# Patient Record
Sex: FEMALE | Race: WHITE | NOT HISPANIC OR LATINO | Employment: OTHER | URBAN - NONMETROPOLITAN AREA
[De-identification: names, ages, dates, MRNs, and addresses within clinical notes are randomized per-mention and may not be internally consistent; named-entity substitution may affect disease eponyms.]

---

## 2019-03-10 ENCOUNTER — HOSPITAL ENCOUNTER (EMERGENCY)
Facility: OTHER | Age: 24
Discharge: HOME | End: 2019-03-11
Attending: EMERGENCY MEDICINE | Admitting: EMERGENCY MEDICINE
Payer: COMMERCIAL

## 2019-03-10 DIAGNOSIS — R10.13 ABDOMINAL PAIN, EPIGASTRIC: ICD-10-CM

## 2019-03-10 PROCEDURE — 83690 ASSAY OF LIPASE: CPT | Performed by: EMERGENCY MEDICINE

## 2019-03-10 PROCEDURE — 99283 EMERGENCY DEPT VISIT LOW MDM: CPT | Mod: Z6 | Performed by: EMERGENCY MEDICINE

## 2019-03-10 PROCEDURE — 36415 COLL VENOUS BLD VENIPUNCTURE: CPT | Performed by: EMERGENCY MEDICINE

## 2019-03-10 PROCEDURE — 96372 THER/PROPH/DIAG INJ SC/IM: CPT | Performed by: EMERGENCY MEDICINE

## 2019-03-10 PROCEDURE — 99284 EMERGENCY DEPT VISIT MOD MDM: CPT | Mod: 25 | Performed by: EMERGENCY MEDICINE

## 2019-03-10 PROCEDURE — 80053 COMPREHEN METABOLIC PANEL: CPT | Performed by: EMERGENCY MEDICINE

## 2019-03-10 PROCEDURE — 85025 COMPLETE CBC W/AUTO DIFF WBC: CPT | Performed by: EMERGENCY MEDICINE

## 2019-03-10 RX ORDER — ALUMINA, MAGNESIA, AND SIMETHICONE 2400; 2400; 240 MG/30ML; MG/30ML; MG/30ML
15 SUSPENSION ORAL ONCE
Status: COMPLETED | OUTPATIENT
Start: 2019-03-10 | End: 2019-03-11

## 2019-03-10 NOTE — ED AVS SNAPSHOT
[facility]  [address]  [place] [address]  Phone:  [phone]  Fax:  [phone]                                    Dania Kruger   MRN: [id number]    Department:  [facility]   Date of Visit:  3/10/2019           After Visit Summary Signature Page    I have received my discharge instructions, and my questions have been answered. I have discussed any challenges I see with this plan with the nurse or doctor.    ..........................................................................................................................................  Patient/Patient Representative Signature      ..........................................................................................................................................  Patient Representative Print Name and Relationship to Patient    ..................................................               ................................................  Date                                   Time    ..........................................................................................................................................  Reviewed by Signature/Title    ...................................................              ..............................................  Date                                               Time          22EPIC Rev 08/18

## 2019-03-11 ENCOUNTER — HOSPITAL ENCOUNTER (OUTPATIENT)
Dept: ULTRASOUND IMAGING | Facility: OTHER | Age: 24
Discharge: HOME | End: 2019-03-11
Attending: EMERGENCY MEDICINE | Admitting: EMERGENCY MEDICINE
Payer: COMMERCIAL

## 2019-03-11 VITALS
HEIGHT: 65 IN | TEMPERATURE: 98 F | RESPIRATION RATE: 16 BRPM | OXYGEN SATURATION: 99 % | DIASTOLIC BLOOD PRESSURE: 81 MMHG | SYSTOLIC BLOOD PRESSURE: 112 MMHG | HEART RATE: 55 BPM

## 2019-03-11 DIAGNOSIS — R10.13 ABDOMINAL PAIN, EPIGASTRIC: ICD-10-CM

## 2019-03-11 LAB
ALBUMIN SERPL-MCNC: 4.5 G/DL (ref 3.5–5.7)
ALP SERPL-CCNC: 54 U/L (ref 34–104)
ALT SERPL W P-5'-P-CCNC: 12 U/L (ref 7–52)
ANION GAP SERPL CALCULATED.3IONS-SCNC: 6 MMOL/L (ref 3–14)
AST SERPL W P-5'-P-CCNC: 11 U/L (ref 13–39)
BASOPHILS # BLD AUTO: 0 10E9/L (ref 0–0.2)
BASOPHILS NFR BLD AUTO: 0.2 %
BILIRUB SERPL-MCNC: 0.4 MG/DL (ref 0.3–1)
BUN SERPL-MCNC: 9 MG/DL (ref 7–25)
CALCIUM SERPL-MCNC: 10 MG/DL (ref 8.6–10.3)
CHLORIDE SERPL-SCNC: 101 MMOL/L (ref 98–107)
CO2 SERPL-SCNC: 26 MMOL/L (ref 21–31)
CREAT SERPL-MCNC: 0.62 MG/DL (ref 0.6–1.2)
DIFFERENTIAL METHOD BLD: NORMAL
EOSINOPHIL # BLD AUTO: 0 10E9/L (ref 0–0.7)
EOSINOPHIL NFR BLD AUTO: 0.4 %
ERYTHROCYTE [DISTWIDTH] IN BLOOD BY AUTOMATED COUNT: 12.2 % (ref 10–15)
GFR SERPL CREATININE-BSD FRML MDRD: >90 ML/MIN/{1.73_M2}
GLUCOSE SERPL-MCNC: 104 MG/DL (ref 70–105)
HCT VFR BLD AUTO: 37.6 % (ref 35–47)
HGB BLD-MCNC: 12.6 G/DL (ref 11.7–15.7)
IMM GRANULOCYTES # BLD: 0 10E9/L (ref 0–0.4)
IMM GRANULOCYTES NFR BLD: 0.2 %
LIPASE SERPL-CCNC: 5 U/L (ref 11–82)
LYMPHOCYTES # BLD AUTO: 2.6 10E9/L (ref 0.8–5.3)
LYMPHOCYTES NFR BLD AUTO: 27 %
MCH RBC QN AUTO: 29.1 PG (ref 26.5–33)
MCHC RBC AUTO-ENTMCNC: 33.5 G/DL (ref 31.5–36.5)
MCV RBC AUTO: 87 FL (ref 78–100)
MONOCYTES # BLD AUTO: 0.7 10E9/L (ref 0–1.3)
MONOCYTES NFR BLD AUTO: 7.7 %
NEUTROPHILS # BLD AUTO: 6.2 10E9/L (ref 1.6–8.3)
NEUTROPHILS NFR BLD AUTO: 64.5 %
PLATELET # BLD AUTO: 283 10E9/L (ref 150–450)
POTASSIUM SERPL-SCNC: 3.9 MMOL/L (ref 3.5–5.1)
PROT SERPL-MCNC: 7.5 G/DL (ref 6.4–8.9)
RBC # BLD AUTO: 4.33 10E12/L (ref 3.8–5.2)
SODIUM SERPL-SCNC: 133 MMOL/L (ref 134–144)
WBC # BLD AUTO: 9.6 10E9/L (ref 4–11)

## 2019-03-11 PROCEDURE — 76700 US EXAM ABDOM COMPLETE: CPT

## 2019-03-11 PROCEDURE — 25000125 ZZHC RX 250: Performed by: EMERGENCY MEDICINE

## 2019-03-11 PROCEDURE — 25000132 ZZH RX MED GY IP 250 OP 250 PS 637: Performed by: EMERGENCY MEDICINE

## 2019-03-11 PROCEDURE — 25000128 H RX IP 250 OP 636: Performed by: EMERGENCY MEDICINE

## 2019-03-11 RX ORDER — KETOROLAC TROMETHAMINE 30 MG/ML
60 INJECTION, SOLUTION INTRAMUSCULAR; INTRAVENOUS ONCE
Status: COMPLETED | OUTPATIENT
Start: 2019-03-11 | End: 2019-03-11

## 2019-03-11 RX ADMIN — LIDOCAINE HYDROCHLORIDE 15 ML: 20 SOLUTION ORAL; TOPICAL at 00:07

## 2019-03-11 RX ADMIN — KETOROLAC TROMETHAMINE 60 MG: 30 INJECTION, SOLUTION INTRAMUSCULAR at 00:34

## 2019-03-11 RX ADMIN — ALUMINUM HYDROXIDE, MAGNESIUM HYDROXIDE, AND DIMETHICONE 15 ML: 400; 400; 40 SUSPENSION ORAL at 00:06

## 2019-03-11 ASSESSMENT — ENCOUNTER SYMPTOMS
NAUSEA: 1
AGITATION: 0
ARTHRALGIAS: 0
EYE REDNESS: 0
VOMITING: 0
FEVER: 0
DYSURIA: 0
LIGHT-HEADEDNESS: 0
SHORTNESS OF BREATH: 0
CHILLS: 0
COUGH: 0

## 2019-03-11 NOTE — ED TRIAGE NOTES
"Pt comes into the ER c/o upper abdominal/back pain that started around 1300. Constant pain. Worse after eating. No vomiting. Nausea. No diarrhea, normal bowel movements. No problems with urination. Was seen in [place] last night for similar symptoms, \"ct scan normal. White count a little elevated. They didn't say what was wrong.\"  Following up due to same symptoms.   "

## 2019-03-11 NOTE — ED PROVIDER NOTES
History     Chief Complaint   Patient presents with     Abdominal Pain     HPI  Dania Kruger is a 23 year old female who is here with abdominal pain.  She says the pain began at 1:00 yesterday afternoon, about 34 hours ago.  It was a couple hours after she had eaten some eggs.  The pain is in the upper abdomen.  When it began she was quite nauseous but that is better now.  She calls a stabbing pain.  Has radiated to the back.  It is across her entire upper abdomen and not worse one side more than the other.  She says she just cannot get comfortable.  There is nothing that makes it better or makes it worse.  Although she is nauseous, she has not had any emesis.  Normal bowel movements.  Urinating normally.  Not feeling dizzy lightheaded and her urine is not becoming dark colored.  She is drinking plenty of liquids but she has eaten some food today as well.  Going over bumps on the right here did seem to make it worse.    She was seen in the emergency department in [place] yesterday.  They did a CT scan which was apparently normal.  White blood count was mildly elevated.  Her pain right now is a 7 or 8 out of 10.  At its worst it was a 9 out of 10.  She feels like it is just not getting any better, it is really not getting a lot worse throughout the day either.    Allergies:  No Known Allergies    Problem List:    There are no active problems to display for this patient.       Past Medical History:    No past medical history on file.    Past Surgical History:    No past surgical history on file.    Family History:    No family history on file.    Social History:  Marital Status:    Social History     Tobacco Use     Smoking status: Not on file   Substance Use Topics     Alcohol use: Not on file     Drug use: Not on file        Medications:      No current outpatient medications on file.      Review of Systems   Constitutional: Negative for chills and fever.   HENT: Negative for congestion.    Eyes: Negative for  "redness.   Respiratory: Negative for cough and shortness of breath.    Cardiovascular: Negative for chest pain.   Gastrointestinal: Positive for nausea. Negative for vomiting.   Genitourinary: Negative for dysuria.   Musculoskeletal: Negative for arthralgias.   Skin: Negative for rash.   Neurological: Negative for light-headedness.   Psychiatric/Behavioral: Negative for agitation.       Physical Exam   BP: 129/84  Pulse: 57  Heart Rate: 64  Temp: 98.6  F (37  C)  Resp: 16  Height: 165.1 cm (5' 5\")  SpO2: 97 %      Physical Exam   Constitutional: She is oriented to person, place, and time. She appears well-developed and well-nourished. No distress.   HENT:   Head: Normocephalic and atraumatic.   Eyes: Conjunctivae are normal.   Neck: Neck supple.   Cardiovascular: Normal rate, regular rhythm and normal heart sounds.   Pulmonary/Chest: Effort normal and breath sounds normal.   Abdominal: Soft. Bowel sounds are normal. There is tenderness in the right upper quadrant, epigastric area and left upper quadrant. There is no rigidity, no guarding and no CVA tenderness.   Neurological: She is alert and oriented to person, place, and time.   Skin: Skin is warm and dry. She is not diaphoretic.   Psychiatric: She has a normal mood and affect. Her behavior is normal.   Nursing note and vitals reviewed.      ED Course     ED Course as of Mar 11 0108   Sun Mar 10, 2019   2862 We will recheck labs tonight including conference of metabolic panel lipase and CBC.  While waiting we will start with a GI cocktail to see if this helps at all.  If this does not help we will try something else for her pain.      Procedures  Results for orders placed or performed during the hospital encounter of 03/10/19   CBC with platelets differential   Result Value Ref Range    WBC 9.6 4.0 - 11.0 10e9/L    RBC Count 4.33 3.8 - 5.2 10e12/L    Hemoglobin 12.6 11.7 - 15.7 g/dL    Hematocrit 37.6 35.0 - 47.0 %    MCV 87 78 - 100 fl    MCH 29.1 26.5 - 33.0 pg "    MCHC 33.5 31.5 - 36.5 g/dL    RDW 12.2 10.0 - 15.0 %    Platelet Count 283 150 - 450 10e9/L    Diff Method Automated Method     % Neutrophils 64.5 %    % Lymphocytes 27.0 %    % Monocytes 7.7 %    % Eosinophils 0.4 %    % Basophils 0.2 %    % Immature Granulocytes 0.2 %    Absolute Neutrophil 6.2 1.6 - 8.3 10e9/L    Absolute Lymphocytes 2.6 0.8 - 5.3 10e9/L    Absolute Monocytes 0.7 0.0 - 1.3 10e9/L    Absolute Eosinophils 0.0 0.0 - 0.7 10e9/L    Absolute Basophils 0.0 0.0 - 0.2 10e9/L    Abs Immature Granulocytes 0.0 0 - 0.4 10e9/L   Comprehensive metabolic panel   Result Value Ref Range    Sodium 133 (L) 134 - 144 mmol/L    Potassium 3.9 3.5 - 5.1 mmol/L    Chloride 101 98 - 107 mmol/L    Carbon Dioxide 26 21 - 31 mmol/L    Anion Gap 6 3 - 14 mmol/L    Glucose 104 70 - 105 mg/dL    Urea Nitrogen 9 7 - 25 mg/dL    Creatinine 0.62 0.60 - 1.20 mg/dL    GFR Estimate >90 >60 mL/min/[1.73_m2]    GFR Estimate If Black >90 >60 mL/min/[1.73_m2]    Calcium 10.0 8.6 - 10.3 mg/dL    Bilirubin Total 0.4 0.3 - 1.0 mg/dL    Albumin 4.5 3.5 - 5.7 g/dL    Protein Total 7.5 6.4 - 8.9 g/dL    Alkaline Phosphatase 54 34 - 104 U/L    ALT 12 7 - 52 U/L    AST 11 (L) 13 - 39 U/L   Lipase   Result Value Ref Range    Lipase 5 (L) 11 - 82 U/L             Was able to review labs and CT scan from the emergency department visit in [place] last night.  This did show elevated white blood count 11.5, however liver related tests lipase and lactic acid were all normal.  CT scan also was normal with no acute findings.  Today I repeated the lab work and the white blood count had returned to normal.  Lipase and liver related tests still normal.    No clear cause for her pain at this time, it is upper abdominal and it did start after having eaten yesterday so gallbladder is certainly a possibility.  Her pain is controlled after a shot of Toradol and I do not believe she has infection and this is not emergent.  I will not call ultrasound in in the  middle the night, however I will order an outpatient ultrasound to better evaluate gallbladder area.  She normally sees Dr. Harmon in [place], and she can call him as well and follow-up there if she prefers for any testing.  In the meantime we will send her home with a few hydrocodone.  She should return if worse.         No results found for this or any previous visit (from the past 24 hour(s)).    Medications   alum & mag hydroxide-simethicone (MYLANTA ES/MAALOX  ES) suspension 15 mL (15 mLs Oral Given 3/11/19 0006)     And   lidocaine VISCOUS (XYLOCAINE) 2 % solution 15 mL (15 mLs Mouth/Throat Given 3/11/19 0007)   ketorolac (TORADOL) injection 60 mg (60 mg Intramuscular Given 3/11/19 0034)       Assessments & Plan (with Medical Decision Making)     I have reviewed the nursing notes.    I have reviewed the findings, diagnosis, plan and need for follow up with the patient.         Medication List      There are no discharge medications for this visit.         Final diagnoses:   Abdominal pain, epigastric       3/10/2019   [facility] [place]     Isac Bowman MD  03/11/19 0108

## 2019-03-22 ENCOUNTER — HEALTH MAINTENANCE LETTER (OUTPATIENT)
Age: 24
End: 2019-03-22

## 2020-03-11 ENCOUNTER — HEALTH MAINTENANCE LETTER (OUTPATIENT)
Age: 25
End: 2020-03-11

## 2020-11-04 ENCOUNTER — MEDICAL CORRESPONDENCE (OUTPATIENT)
Dept: HEALTH INFORMATION MANAGEMENT | Facility: OTHER | Age: 25
End: 2020-11-04

## 2020-11-04 ENCOUNTER — TRANSFERRED RECORDS (OUTPATIENT)
Dept: HEALTH INFORMATION MANAGEMENT | Facility: OTHER | Age: 25
End: 2020-11-04

## 2020-11-20 ENCOUNTER — VIRTUAL VISIT (OUTPATIENT)
Dept: OBGYN | Facility: OTHER | Age: 25
End: 2020-11-20
Attending: OBSTETRICS & GYNECOLOGY
Payer: COMMERCIAL

## 2020-11-20 VITALS — HEIGHT: 64 IN | WEIGHT: 150 LBS | BODY MASS INDEX: 25.61 KG/M2

## 2020-11-20 DIAGNOSIS — Z32.01 PREGNANCY EXAMINATION OR TEST, POSITIVE RESULT: Primary | ICD-10-CM

## 2020-11-20 PROCEDURE — 99207 PR OB VISIT-NO CHARGE - GICH ONLY: CPT | Mod: 95

## 2020-11-20 ASSESSMENT — PATIENT HEALTH QUESTIONNAIRE - PHQ9: SUM OF ALL RESPONSES TO PHQ QUESTIONS 1-9: 0

## 2020-11-20 ASSESSMENT — MIFFLIN-ST. JEOR: SCORE: 1420.4

## 2020-11-20 NOTE — PROGRESS NOTES
HPI:    This is a 25 year old female patient,  who was called today for OB Intake visit. Patient reports positive pregnancy test at home.     Obstetrical history and OB Demographics updated to the best of this nurse's ability based on patient report. PHQ-9 depression screening and routine Domestic Abuse screening completed. All immediate questions and concerns answered.    Last menstrual period is reported as Patient's last menstrual period was 10/02/2020 (exact date). SANDIE based on LMP is 2021.  Her cycles are regular.  Her last menstrual period was normal.   Since her LMP, she has experienced  nausea, abdominal pain, fatigue, headache and loss of appetite.       Personal OB history includes: none  Previous OB Provider: NA  Previous Delivering Clinic: NA  Release of Records: NA    Current delivery plan: GICH  Preferred OB Provider: Dr. Joseph William MD  Current Primary Care Provider: General  Pediatrician: unknown    Additional History: None    Have you travelled during the pregnancy?No  Have your sexual partner(s) travelled during the pregnancy?No      HISTORY:   Planned Pregnancy: Yes  Marital Status: Single  Occupation: Teacher in [email or web address]  Living in Household: Spouse    Father of the baby is involved.   Family and father of baby are supportive of current pregnancy.  Past Medical History of Father of Baby:Insulin Dependent    Past History:  Her past medical history is non-contributory.      She has a history of  none    Since her last LMP she denies use of alcohol, tobacco and street drugs.    Pap smear history: NO - age 21-29 PAP every 3 years recommended    STD/STI history: No STD history    STD/STI symptoms: no noticeable symptoms     Past medical, surgical, social and family history were reviewed and updated in EPIC.    Medications reviewed by this nurse. Current medication list:  Current Outpatient Medications   Medication Sig Dispense Refill     Prenatal Vit-Fe Fumarate-FA (PRENATAL  MULTIVITAMIN  PLUS IRON) 27-1 MG TABS Take 1 tablet by mouth daily       The following medications were recommended to be discontinued due to Pregnancy Category D status: NA  Patient informed to contact her primary care provider as soon as possible to discuss a safer alternative.    Risk factors:  Moderate and moderately severe risks (consult with OB/Gyn)  Previous fetal or  demise: No  History of  delivery: No  History of heart disease Class I: No  Severe anemia, unresponsive to iron therapy: No  Pelvic mass or neoplasm: No  Previous : No  Hyper/hypothyroidism: No  History of postpartum hemorrhage requiring transfusion:No  History of Placenta Accreta: No    High Risk (Pregnancy managed by OB/Gyn)  Multiple pregnancy: No  Pre-gestational diabetes: No  Chronic Hypertension: No  Renal Failure: No  Heart disease, class II or greater: No  Rh Isoimmunization: No  Chronic active hepatitis: No  Convulsive disorder, poorly controlled: No  Isoimmune thrombocytopenia: No  Pre-term premature rupture of membranes: No  Lupus or other autoimmune disorder: No  Human Immunodeficiency Virus: No      ASSESSMENT/PLAN:     No diagnosis found.    25 year old , 7w0d of pregnancy with SANDIE of 2021, Alternate SANDIE Entry    Per standing orders and scope of practice of this nurse, patient will have the following orders placed and completed prior to initial OB visit with the appropriate provider:    --early ultrasound for dating and viability ordered for 6+ weeks gestation based on LMP    --Quantitative Beta HCG and progesterone monitoring if indicated    Counseling given:     - Recommended weight gain for pregnancy: 15-25 lbs.   BMI < 18.5  28-40 lbs   18.5 - 24.9 25-35   25 - 29.9 15-25   > 30  < 15       PLAN/PATIENT INSTRUCTIONS:    Normal exercise.  Normal sexual activity.  Prenatal vitamins.  Anticipated weight gain.    follow-up appointment with Dr. Dr. Joseph William MD for pre- care and take  multivitamin or pre- vitamins    Yanet William RN.................................................. 2020 1:41 PM

## 2020-12-01 ENCOUNTER — TRANSFERRED RECORDS (OUTPATIENT)
Dept: HEALTH INFORMATION MANAGEMENT | Facility: OTHER | Age: 25
End: 2020-12-01

## 2020-12-08 ENCOUNTER — PRENATAL OFFICE VISIT (OUTPATIENT)
Dept: OBGYN | Facility: OTHER | Age: 25
End: 2020-12-08
Attending: OBSTETRICS & GYNECOLOGY
Payer: COMMERCIAL

## 2020-12-08 ENCOUNTER — TRANSFERRED RECORDS (OUTPATIENT)
Dept: HEALTH INFORMATION MANAGEMENT | Facility: OTHER | Age: 25
End: 2020-12-08

## 2020-12-08 DIAGNOSIS — Z3A.09 9 WEEKS GESTATION OF PREGNANCY: Primary | ICD-10-CM

## 2020-12-08 LAB — HIV 1&2 EXT: NORMAL

## 2020-12-08 PROCEDURE — 99207 PR OB VISIT-NO CHARGE - GICH ONLY: CPT | Performed by: OBSTETRICS & GYNECOLOGY

## 2020-12-10 NOTE — PROGRESS NOTES
CC: New OB visit  HPI:  Dania Kruger is  at 9w6d based on Patient's last menstrual period was 10/02/2020 (exact date)./first trimester US confirms.  She notes issues of fatigue    OB History    Para Term  AB Living   1 0 0 0 0 0   SAB TAB Ectopic Multiple Live Births   0 0 0 0 0      # Outcome Date GA Lbr Nathan/2nd Weight Sex Delivery Anes PTL Lv   1 Current              STI: (denies HSV, Hep C, Hep B, HIV, Syphilis, Chlamydia, Gonorrhea)  Last pap smear: No results found for: PAP    No past medical history on file.   has no past surgical history on file.    Social History     Tobacco Use     Smoking status: Never Smoker     Smokeless tobacco: Never Used   Substance Use Topics     Alcohol use: Not Currently     Drug use: Never     No family history on file.      Current Outpatient Medications   Medication     Prenatal Vit-Fe Fumarate-FA (PRENATAL MULTIVITAMIN  PLUS IRON) 27-1 MG TABS     No current facility-administered medications for this visit.      No Known Allergies    There is no immunization history on file for this patient.        REVIEW OF SYSTEMS  ENT: negative  Resp: No shortness of breath, dyspnea on exertion, cough, or hemoptysis  CV: negative  GI: negative  : negative    EXAM: LMP 10/02/2020 (Exact Date)   Gen: NAD  CV: RRR with normal S1, S2, no GRM  Resp: CTA Bilaterally  Abdomen: NT, ND  Extremities: No TTP, no deformity  Neuro: CN II-XII intact grossly, moves all extremities  Psych: normal affect and mentation.    I/P  No diagnosis found.      Discussed safety, nutrition, screening for cystic fibrosis, spina bifida, spinal muscular atrophy, quad screen, cffDNA screening as appropriate.  F/U scheduled, discussed call schedule rotation with KURTIS and Dr. William, general surgery.  Return visit in 1 month     Pregnancy risk factors include:  None  Joseph William MD   2:49 PM 12/10/2020

## 2021-01-03 ENCOUNTER — HEALTH MAINTENANCE LETTER (OUTPATIENT)
Age: 26
End: 2021-01-03

## 2021-01-12 ENCOUNTER — PRENATAL OFFICE VISIT (OUTPATIENT)
Dept: OBGYN | Facility: OTHER | Age: 26
End: 2021-01-12
Attending: OBSTETRICS & GYNECOLOGY
Payer: COMMERCIAL

## 2021-01-12 DIAGNOSIS — Z3A.20 20 WEEKS GESTATION OF PREGNANCY: ICD-10-CM

## 2021-01-12 DIAGNOSIS — Z3A.14 14 WEEKS GESTATION OF PREGNANCY: Primary | ICD-10-CM

## 2021-01-12 PROCEDURE — 99207 PR OB VISIT-NO CHARGE - GICH ONLY: CPT | Performed by: OBSTETRICS & GYNECOLOGY

## 2021-01-14 VITALS
SYSTOLIC BLOOD PRESSURE: 123 MMHG | BODY MASS INDEX: 26.32 KG/M2 | WEIGHT: 153.31 LBS | DIASTOLIC BLOOD PRESSURE: 80 MMHG

## 2021-01-14 NOTE — PROGRESS NOTES
See in [place]  Continues to do well  Quad discussed  Planning 4 weeks in [facility]  20 week US in [facility]

## 2021-02-09 ENCOUNTER — PRENATAL OFFICE VISIT (OUTPATIENT)
Dept: OBGYN | Facility: OTHER | Age: 26
End: 2021-02-09
Attending: OBSTETRICS & GYNECOLOGY
Payer: COMMERCIAL

## 2021-02-09 DIAGNOSIS — Z3A.18 18 WEEKS GESTATION OF PREGNANCY: Primary | ICD-10-CM

## 2021-02-09 PROCEDURE — 99207 PR OB VISIT-NO CHARGE - GICH ONLY: CPT | Performed by: OBSTETRICS & GYNECOLOGY

## 2021-02-11 NOTE — PROGRESS NOTES
Seen in [place]  Overall doing well  Unsure if feeling movement  Declines quad  US next week in [place]  One month in [facility]

## 2021-02-18 ENCOUNTER — PRENATAL OFFICE VISIT (OUTPATIENT)
Dept: OBGYN | Facility: OTHER | Age: 26
End: 2021-02-18
Attending: OBSTETRICS & GYNECOLOGY
Payer: COMMERCIAL

## 2021-02-18 ENCOUNTER — HOSPITAL ENCOUNTER (OUTPATIENT)
Dept: ULTRASOUND IMAGING | Facility: OTHER | Age: 26
End: 2021-02-18
Attending: OBSTETRICS & GYNECOLOGY
Payer: COMMERCIAL

## 2021-02-18 VITALS
WEIGHT: 157.9 LBS | BODY MASS INDEX: 27.1 KG/M2 | HEART RATE: 103 BPM | SYSTOLIC BLOOD PRESSURE: 118 MMHG | DIASTOLIC BLOOD PRESSURE: 70 MMHG

## 2021-02-18 DIAGNOSIS — Z3A.24 24 WEEKS GESTATION OF PREGNANCY: ICD-10-CM

## 2021-02-18 DIAGNOSIS — Z3A.19 19 WEEKS GESTATION OF PREGNANCY: Primary | ICD-10-CM

## 2021-02-18 DIAGNOSIS — Z3A.20 20 WEEKS GESTATION OF PREGNANCY: ICD-10-CM

## 2021-02-18 PROCEDURE — 99207 PR OB VISIT-NO CHARGE - GICH ONLY: CPT | Performed by: OBSTETRICS & GYNECOLOGY

## 2021-02-18 PROCEDURE — 76805 OB US >/= 14 WKS SNGL FETUS: CPT

## 2021-02-18 ASSESSMENT — PAIN SCALES - GENERAL: PAINLEVEL: NO PAIN (0)

## 2021-02-18 NOTE — NURSING NOTE
Chief Complaint   Patient presents with     Prenatal Care     19w 6d      US today. Some questions or concerns.     Medication Reconciliation: complete    Lucy Foley LPN

## 2021-02-18 NOTE — PROGRESS NOTES
US today  Growth at 11% (5 day lag) - symmetrical  No abn seen by exam of heart and spine less than optimal due to position  Posterior placenta without previa  F/U in BF 3 weeks, 5 weeks here for repeat US

## 2021-02-24 ENCOUNTER — TELEPHONE (OUTPATIENT)
Dept: OBGYN | Facility: OTHER | Age: 26
End: 2021-02-24

## 2021-02-24 NOTE — TELEPHONE ENCOUNTER
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 States she tried to get fresh air as often as she could and did get a headache at one point. States she feels fine now with no complaints at this time. Advised patient to stay in well ventilated areas when around strong fumes and to avoid them when possible. Patient also stated that she is having an increase in her cloudy urine. Advised her to call her clinic in [facility] to get her urine checked out as Dr. William is out of office this week. Patient voiced understanding with no further questions or concerns at this time.   Yanet William RN on 2/24/2021 at 2:43 PM

## 2021-03-09 ENCOUNTER — PRENATAL OFFICE VISIT (OUTPATIENT)
Dept: OBGYN | Facility: OTHER | Age: 26
End: 2021-03-09
Attending: OBSTETRICS & GYNECOLOGY
Payer: COMMERCIAL

## 2021-03-09 VITALS — DIASTOLIC BLOOD PRESSURE: 72 MMHG | BODY MASS INDEX: 28.49 KG/M2 | SYSTOLIC BLOOD PRESSURE: 116 MMHG | WEIGHT: 166 LBS

## 2021-03-09 DIAGNOSIS — Z3A.22 22 WEEKS GESTATION OF PREGNANCY: Primary | ICD-10-CM

## 2021-03-09 PROCEDURE — 99207 PR OB VISIT-NO CHARGE - GICH ONLY: CPT | Performed by: OBSTETRICS & GYNECOLOGY

## 2021-03-25 ENCOUNTER — HOSPITAL ENCOUNTER (OUTPATIENT)
Dept: ULTRASOUND IMAGING | Facility: OTHER | Age: 26
End: 2021-03-25
Attending: OBSTETRICS & GYNECOLOGY
Payer: COMMERCIAL

## 2021-03-25 ENCOUNTER — PRENATAL OFFICE VISIT (OUTPATIENT)
Dept: OBGYN | Facility: OTHER | Age: 26
End: 2021-03-25
Attending: OBSTETRICS & GYNECOLOGY
Payer: COMMERCIAL

## 2021-03-25 VITALS
RESPIRATION RATE: 8 BRPM | HEART RATE: 88 BPM | WEIGHT: 164.9 LBS | SYSTOLIC BLOOD PRESSURE: 126 MMHG | BODY MASS INDEX: 28.31 KG/M2 | DIASTOLIC BLOOD PRESSURE: 72 MMHG

## 2021-03-25 DIAGNOSIS — Z3A.24 24 WEEKS GESTATION OF PREGNANCY: ICD-10-CM

## 2021-03-25 DIAGNOSIS — Z3A.24 24 WEEKS GESTATION OF PREGNANCY: Primary | ICD-10-CM

## 2021-03-25 PROCEDURE — 76816 OB US FOLLOW-UP PER FETUS: CPT

## 2021-03-25 PROCEDURE — 99207 PR OB VISIT-NO CHARGE - GICH ONLY: CPT | Performed by: OBSTETRICS & GYNECOLOGY

## 2021-03-25 ASSESSMENT — PAIN SCALES - GENERAL: PAINLEVEL: NO PAIN (0)

## 2021-03-25 NOTE — NURSING NOTE
"Chief Complaint   Patient presents with     Prenatal Care     24w 6d       Initial /72 (BP Location: Right arm, Patient Position: Sitting, Cuff Size: Adult Regular)   Pulse 88   Resp 8   Wt 74.8 kg (164 lb 14.4 oz)   LMP 10/02/2020 (Exact Date)   Breastfeeding No   BMI 28.31 kg/m   Estimated body mass index is 28.31 kg/m  as calculated from the following:    Height as of 11/20/20: 1.626 m (5' 4\").    Weight as of this encounter: 74.8 kg (164 lb 14.4 oz).  Medication Reconciliation: Completed     Neftali Torres LPN  "

## 2021-03-25 NOTE — PROGRESS NOTES
F/U US   Growth at 25%, symmetrical  Heart and spine look good  CRISTINA 14  Placenta posterior  Next appt in [facility] with BS and labs

## 2021-04-13 ENCOUNTER — TRANSFERRED RECORDS (OUTPATIENT)
Dept: HEALTH INFORMATION MANAGEMENT | Facility: OTHER | Age: 26
End: 2021-04-13

## 2021-04-25 ENCOUNTER — HEALTH MAINTENANCE LETTER (OUTPATIENT)
Age: 26
End: 2021-04-25

## 2021-04-30 ENCOUNTER — PRENATAL OFFICE VISIT (OUTPATIENT)
Dept: OBGYN | Facility: OTHER | Age: 26
End: 2021-04-30
Attending: OBSTETRICS & GYNECOLOGY
Payer: COMMERCIAL

## 2021-04-30 VITALS
BODY MASS INDEX: 29.7 KG/M2 | DIASTOLIC BLOOD PRESSURE: 68 MMHG | WEIGHT: 173 LBS | SYSTOLIC BLOOD PRESSURE: 116 MMHG | HEART RATE: 80 BPM

## 2021-04-30 DIAGNOSIS — Z3A.30 30 WEEKS GESTATION OF PREGNANCY: Primary | ICD-10-CM

## 2021-04-30 PROCEDURE — 99207 PR OB VISIT-NO CHARGE - GICH ONLY: CPT | Performed by: OBSTETRICS & GYNECOLOGY

## 2021-04-30 ASSESSMENT — PAIN SCALES - GENERAL: PAINLEVEL: NO PAIN (0)

## 2021-04-30 NOTE — NURSING NOTE
Chief Complaint   Patient presents with     Prenatal Care     30w0d     Offers no complaints and baby is moving great   Lea Kruger LPN........................4/30/2021  10:50 AM     Medication Reconciliation: completed   Lea Kruger LPN  4/30/2021 10:50 AM

## 2021-05-11 ENCOUNTER — PRENATAL OFFICE VISIT (OUTPATIENT)
Dept: OBGYN | Facility: OTHER | Age: 26
End: 2021-05-11
Attending: OBSTETRICS & GYNECOLOGY
Payer: COMMERCIAL

## 2021-05-11 DIAGNOSIS — Z3A.31 31 WEEKS GESTATION OF PREGNANCY: Primary | ICD-10-CM

## 2021-05-11 PROCEDURE — 99207 PR OB VISIT-NO CHARGE - GICH ONLY: CPT | Performed by: OBSTETRICS & GYNECOLOGY

## 2021-05-13 NOTE — PROGRESS NOTES
Seen in [place]  Doing well  No concerns  Good fetal activity  Is probably going to decline the tdap  2 week f/u in [place]

## 2021-05-27 ENCOUNTER — PRENATAL OFFICE VISIT (OUTPATIENT)
Dept: OBGYN | Facility: OTHER | Age: 26
End: 2021-05-27
Attending: OBSTETRICS & GYNECOLOGY
Payer: COMMERCIAL

## 2021-05-27 VITALS
SYSTOLIC BLOOD PRESSURE: 120 MMHG | HEART RATE: 104 BPM | DIASTOLIC BLOOD PRESSURE: 72 MMHG | BODY MASS INDEX: 30.36 KG/M2 | RESPIRATION RATE: 16 BRPM | WEIGHT: 176.9 LBS

## 2021-05-27 PROCEDURE — 99207 PR OB VISIT-NO CHARGE - GICH ONLY: CPT | Performed by: OBSTETRICS & GYNECOLOGY

## 2021-05-27 ASSESSMENT — PAIN SCALES - GENERAL: PAINLEVEL: NO PAIN (0)

## 2021-05-27 NOTE — NURSING NOTE
"Chief Complaint   Patient presents with     Prenatal Care     33w 6d     Patient stated no concerns. Doing well. Normal fetal movement.    Initial /72 (BP Location: Right arm, Patient Position: Sitting, Cuff Size: Adult Regular)   Pulse 104   Resp 16   Wt 80.2 kg (176 lb 14.4 oz)   LMP 10/02/2020 (Exact Date)   Breastfeeding No   BMI 30.36 kg/m   Estimated body mass index is 30.36 kg/m  as calculated from the following:    Height as of 11/20/20: 1.626 m (5' 4\").    Weight as of this encounter: 80.2 kg (176 lb 14.4 oz).  Medication Reconciliation: Completed     Neftali Torres LPN  "

## 2021-06-08 ENCOUNTER — PRENATAL OFFICE VISIT (OUTPATIENT)
Dept: OBGYN | Facility: OTHER | Age: 26
End: 2021-06-08
Attending: OBSTETRICS & GYNECOLOGY
Payer: COMMERCIAL

## 2021-06-08 DIAGNOSIS — Z3A.35 35 WEEKS GESTATION OF PREGNANCY: Primary | ICD-10-CM

## 2021-06-08 PROCEDURE — 99207 PR OB VISIT-NO CHARGE - GICH ONLY: CPT | Performed by: OBSTETRICS & GYNECOLOGY

## 2021-06-10 VITALS
SYSTOLIC BLOOD PRESSURE: 124 MMHG | DIASTOLIC BLOOD PRESSURE: 73 MMHG | BODY MASS INDEX: 30.65 KG/M2 | WEIGHT: 178.56 LBS

## 2021-06-14 ENCOUNTER — PRENATAL OFFICE VISIT (OUTPATIENT)
Dept: OBGYN | Facility: OTHER | Age: 26
End: 2021-06-14
Attending: OBSTETRICS & GYNECOLOGY
Payer: COMMERCIAL

## 2021-06-14 VITALS — SYSTOLIC BLOOD PRESSURE: 116 MMHG | DIASTOLIC BLOOD PRESSURE: 72 MMHG

## 2021-06-14 DIAGNOSIS — Z3A.36 36 WEEKS GESTATION OF PREGNANCY: Primary | ICD-10-CM

## 2021-06-14 PROCEDURE — 99207 PR OB VISIT-NO CHARGE - GICH ONLY: CPT | Performed by: OBSTETRICS & GYNECOLOGY

## 2021-06-14 PROCEDURE — 87081 CULTURE SCREEN ONLY: CPT | Mod: ZL | Performed by: OBSTETRICS & GYNECOLOGY

## 2021-06-14 NOTE — PROGRESS NOTES
Doing well  Infant active  Occasional ctx  (Told me after the check that she might have had a bit of watery discharge but none recently)  Portable US confirms vertex.  CRISTINA 11 (Max pocket of 5)  GBS done  To return if further watery discharge

## 2021-06-16 LAB
BACTERIA SPEC CULT: NORMAL
SPECIMEN SOURCE: NORMAL

## 2021-06-21 ENCOUNTER — PRENATAL OFFICE VISIT (OUTPATIENT)
Dept: OBGYN | Facility: OTHER | Age: 26
End: 2021-06-21
Attending: OBSTETRICS & GYNECOLOGY
Payer: COMMERCIAL

## 2021-06-21 VITALS
HEART RATE: 92 BPM | RESPIRATION RATE: 12 BRPM | BODY MASS INDEX: 31.48 KG/M2 | WEIGHT: 183.4 LBS | DIASTOLIC BLOOD PRESSURE: 64 MMHG | SYSTOLIC BLOOD PRESSURE: 130 MMHG

## 2021-06-21 DIAGNOSIS — Z3A.37 37 WEEKS GESTATION OF PREGNANCY: Primary | ICD-10-CM

## 2021-06-21 LAB — A1 MICROGLOB PLACENTAL VAG QL: NEGATIVE

## 2021-06-21 PROCEDURE — 84112 EVAL AMNIOTIC FLUID PROTEIN: CPT | Mod: ZL | Performed by: OBSTETRICS & GYNECOLOGY

## 2021-06-21 PROCEDURE — 99207 PR OB VISIT-NO CHARGE - GICH ONLY: CPT | Performed by: OBSTETRICS & GYNECOLOGY

## 2021-06-21 ASSESSMENT — PAIN SCALES - GENERAL: PAINLEVEL: NO PAIN (0)

## 2021-06-21 NOTE — PROGRESS NOTES
Infant active  GBS neg  Still with occasional discharge - will check amni-sure - negative  Labor again reviewed  One week

## 2021-06-21 NOTE — NURSING NOTE
"Chief Complaint   Patient presents with     Prenatal Care     37w 3d     Patient stated no concerns. Baby is active.    Initial /64 (BP Location: Right arm, Patient Position: Sitting, Cuff Size: Adult Regular)   Pulse 92   Resp 12   Wt 83.2 kg (183 lb 6.4 oz)   LMP 10/02/2020 (Exact Date)   Breastfeeding No   BMI 31.48 kg/m   Estimated body mass index is 31.48 kg/m  as calculated from the following:    Height as of 11/20/20: 1.626 m (5' 4\").    Weight as of this encounter: 83.2 kg (183 lb 6.4 oz).  Medication Reconciliation: Completed     Neftali Torres LPN  "

## 2021-07-01 ENCOUNTER — PRENATAL OFFICE VISIT (OUTPATIENT)
Dept: OBGYN | Facility: OTHER | Age: 26
End: 2021-07-01
Attending: OBSTETRICS & GYNECOLOGY
Payer: COMMERCIAL

## 2021-07-01 VITALS
BODY MASS INDEX: 31.53 KG/M2 | RESPIRATION RATE: 16 BRPM | HEART RATE: 88 BPM | SYSTOLIC BLOOD PRESSURE: 136 MMHG | WEIGHT: 183.7 LBS | OXYGEN SATURATION: 98 % | DIASTOLIC BLOOD PRESSURE: 70 MMHG

## 2021-07-01 DIAGNOSIS — Z3A.38 38 WEEKS GESTATION OF PREGNANCY: Primary | ICD-10-CM

## 2021-07-01 PROCEDURE — 99207 PR OB VISIT-NO CHARGE - GICH ONLY: CPT | Performed by: OBSTETRICS & GYNECOLOGY

## 2021-07-01 ASSESSMENT — PAIN SCALES - GENERAL: PAINLEVEL: NO PAIN (0)

## 2021-07-01 NOTE — NURSING NOTE
"Chief Complaint   Patient presents with     Prenatal Care     38w 6d         Initial /70   Pulse 88   Resp 16   Wt 83.3 kg (183 lb 11.2 oz)   LMP 10/02/2020 (Exact Date)   SpO2 98%   BMI 31.53 kg/m   Estimated body mass index is 31.53 kg/m  as calculated from the following:    Height as of 11/20/20: 1.626 m (5' 4\").    Weight as of this encounter: 83.3 kg (183 lb 11.2 oz).     FOOD SECURITY SCREENING QUESTIONS  Hunger Vital Signs:  Within the past 12 months we worried whether our food would run out before we got money to buy more. Never  Within the past 12 months the food we bought just didn't last and we didn't have money to get more. Never      Medication Reconciliation: Complete      Norma J. Gosselin, LPN   "

## 2021-07-01 NOTE — PROGRESS NOTES
Doing well  Infant very active  No significant contractions  Discussed induction at 41 weeks of still pregnant - pt agrees

## 2021-07-06 ENCOUNTER — PRENATAL OFFICE VISIT (OUTPATIENT)
Dept: OBGYN | Facility: OTHER | Age: 26
End: 2021-07-06
Attending: OBSTETRICS & GYNECOLOGY
Payer: COMMERCIAL

## 2021-07-06 VITALS
RESPIRATION RATE: 18 BRPM | DIASTOLIC BLOOD PRESSURE: 80 MMHG | HEART RATE: 113 BPM | BODY MASS INDEX: 32.06 KG/M2 | WEIGHT: 186.8 LBS | SYSTOLIC BLOOD PRESSURE: 124 MMHG

## 2021-07-06 DIAGNOSIS — Z34.90 NORMAL PREGNANCY, ANTEPARTUM: Primary | ICD-10-CM

## 2021-07-06 PROCEDURE — 99207 PR OB VISIT-NO CHARGE - GICH ONLY: CPT | Performed by: OBSTETRICS & GYNECOLOGY

## 2021-07-06 ASSESSMENT — PAIN SCALES - GENERAL: PAINLEVEL: NO PAIN (0)

## 2021-07-06 NOTE — PROGRESS NOTES
CC: Recheck OB visit at 39w4d    HPI: Dania Bhatti presents for a routine OB visit now at 39w4d  Concerns: No  Patient notices normal fetal movement, denies contractions, vaginal bleeding or leaking of fluid.    OB History    Para Term  AB Living   1 0 0 0 0 0   SAB TAB Ectopic Multiple Live Births   0 0 0 0 0      # Outcome Date GA Lbr Nathan/2nd Weight Sex Delivery Anes PTL Lv   1 Current              Current Outpatient Medications   Medication     Prenatal Vit-Fe Fumarate-FA (PRENATAL MULTIVITAMIN  PLUS IRON) 27-1 MG TABS     No current facility-administered medications for this visit.      O: /80 (BP Location: Right arm, Patient Position: Sitting, Cuff Size: Adult Regular)   Pulse 113   Resp 18   Wt 84.7 kg (186 lb 12.8 oz)   LMP 10/02/2020 (Exact Date)   BMI 32.06 kg/m    Body mass index is 32.06 kg/m .  See OB flow sheet  EXAM:  NAD    FHT: 140 bpm  Cervix closed, thick, soft, -3    No results found for any visits on 21.    A/P: 39w4d gestation    Recheck in 1 week    Problem List:       Oh Hong MD FACOG  1:49 PM 2021

## 2021-07-06 NOTE — NURSING NOTE
"Chief Complaint   Patient presents with     Prenatal Care     39w4d   Patient presents to clinic for prenatal care, no concerns noted.    Initial /80 (BP Location: Right arm, Patient Position: Sitting, Cuff Size: Adult Regular)   Pulse 113   Resp 18   Wt 84.7 kg (186 lb 12.8 oz)   LMP 10/02/2020 (Exact Date)   BMI 32.06 kg/m   Estimated body mass index is 32.06 kg/m  as calculated from the following:    Height as of 11/20/20: 1.626 m (5' 4\").    Weight as of this encounter: 84.7 kg (186 lb 12.8 oz).  Medication Reconciliation: complete    PO CALABRESE, LPN  "

## 2021-07-12 ENCOUNTER — PRENATAL OFFICE VISIT (OUTPATIENT)
Dept: OBGYN | Facility: OTHER | Age: 26
End: 2021-07-12
Attending: OBSTETRICS & GYNECOLOGY
Payer: COMMERCIAL

## 2021-07-12 VITALS
WEIGHT: 186.4 LBS | BODY MASS INDEX: 32 KG/M2 | HEART RATE: 90 BPM | DIASTOLIC BLOOD PRESSURE: 84 MMHG | SYSTOLIC BLOOD PRESSURE: 130 MMHG | RESPIRATION RATE: 18 BRPM

## 2021-07-12 DIAGNOSIS — O48.0 POSTMATURITY PREGNANCY, 40-42 WEEKS GESTATION: Primary | ICD-10-CM

## 2021-07-12 PROCEDURE — 99207 PR OB VISIT-NO CHARGE - GICH ONLY: CPT | Performed by: OBSTETRICS & GYNECOLOGY

## 2021-07-12 PROCEDURE — 59025 FETAL NON-STRESS TEST: CPT | Performed by: OBSTETRICS & GYNECOLOGY

## 2021-07-12 ASSESSMENT — PAIN SCALES - GENERAL: PAINLEVEL: NO PAIN (0)

## 2021-07-12 NOTE — NURSING NOTE
"Chief Complaint   Patient presents with     Prenatal Care     40w3d   Patient presents to clinic for prenatal care. No concerns noted.    Initial /84 (BP Location: Right arm, Patient Position: Sitting, Cuff Size: Adult Regular)   Pulse 90   Resp 18   Wt 84.6 kg (186 lb 6.4 oz)   LMP 10/02/2020 (Exact Date)   BMI 32.00 kg/m   Estimated body mass index is 32 kg/m  as calculated from the following:    Height as of 11/20/20: 1.626 m (5' 4\").    Weight as of this encounter: 84.6 kg (186 lb 6.4 oz).  Medication Reconciliation: complete    PO CALABRESE, VELVETN  "

## 2021-07-12 NOTE — PROGRESS NOTES
CC: Recheck OB visit at 40w3d    HPI: Dania Bhatti presents for a routine OB visit now at 40w3d  Concerns: active baby  Patient notices normal fetal movement, denies contractions, vaginal bleeding or leaking of fluid.    OB History    Para Term  AB Living   1 0 0 0 0 0   SAB TAB Ectopic Multiple Live Births   0 0 0 0 0      # Outcome Date GA Lbr Nathan/2nd Weight Sex Delivery Anes PTL Lv   1 Current              Current Outpatient Medications   Medication     Prenatal Vit-Fe Fumarate-FA (PRENATAL MULTIVITAMIN  PLUS IRON) 27-1 MG TABS     No current facility-administered medications for this visit.       O: /84 (BP Location: Right arm, Patient Position: Sitting, Cuff Size: Adult Regular)   Pulse 90   Resp 18   Wt 84.6 kg (186 lb 6.4 oz)   LMP 10/02/2020 (Exact Date)   BMI 32.00 kg/m    Body mass index is 32 kg/m .  See OB flow sheet  EXAM:  NAD    FHT: 145 bpm  LTC  AGA    NST doucmentation:    Indication: (O48.0) Postmaturity pregnancy, 40-42 weeks gestation  (primary encounter diagnosis)  Comment:   Plan: Asymptomatic COVID-19 Virus (Coronavirus) by         PCR Nasopharyngeal            Duration: 30 min     40w3d  FHR baseline: 140 with moderate variability  Accelerations: y  Decelerations: occasional early  Contractions: n    Category 1      No results found for any visits on 21.    A/P: 40w3d gestation    Plan for ripening and induction 21    Problem List:     Post term    Oh Hong MD FACOG  2:12 PM 2021

## 2021-07-17 ENCOUNTER — HOSPITAL ENCOUNTER (OUTPATIENT)
Facility: OTHER | Age: 26
Discharge: HOME | End: 2021-07-17
Attending: OBSTETRICS & GYNECOLOGY | Admitting: OBSTETRICS & GYNECOLOGY
Payer: COMMERCIAL

## 2021-07-17 VITALS
RESPIRATION RATE: 18 BRPM | HEART RATE: 101 BPM | TEMPERATURE: 97.5 F | SYSTOLIC BLOOD PRESSURE: 131 MMHG | DIASTOLIC BLOOD PRESSURE: 74 MMHG | OXYGEN SATURATION: 97 %

## 2021-07-17 PROCEDURE — 250N000013 HC RX MED GY IP 250 OP 250 PS 637: Performed by: OBSTETRICS & GYNECOLOGY

## 2021-07-17 PROCEDURE — G0463 HOSPITAL OUTPT CLINIC VISIT: HCPCS

## 2021-07-17 RX ORDER — ACETAMINOPHEN 325 MG/1
650 TABLET ORAL EVERY 4 HOURS PRN
Status: DISCONTINUED | OUTPATIENT
Start: 2021-07-17 | End: 2021-07-18 | Stop reason: HOSPADM

## 2021-07-17 RX ORDER — LIDOCAINE 40 MG/G
CREAM TOPICAL
Status: DISCONTINUED | OUTPATIENT
Start: 2021-07-17 | End: 2021-07-18 | Stop reason: HOSPADM

## 2021-07-17 RX ADMIN — ACETAMINOPHEN 650 MG: 325 TABLET, FILM COATED ORAL at 23:01

## 2021-07-18 ENCOUNTER — ANESTHESIA EVENT (OUTPATIENT)
Dept: SURGERY | Facility: OTHER | Age: 26
End: 2021-07-18
Payer: COMMERCIAL

## 2021-07-18 ENCOUNTER — HOSPITAL ENCOUNTER (INPATIENT)
Facility: OTHER | Age: 26
LOS: 2 days | Discharge: HOME | End: 2021-07-20
Attending: OBSTETRICS & GYNECOLOGY | Admitting: OBSTETRICS & GYNECOLOGY
Payer: COMMERCIAL

## 2021-07-18 ENCOUNTER — ANESTHESIA (OUTPATIENT)
Dept: SURGERY | Facility: OTHER | Age: 26
End: 2021-07-18
Payer: COMMERCIAL

## 2021-07-18 DIAGNOSIS — Z91.89: ICD-10-CM

## 2021-07-18 DIAGNOSIS — Z91.89: Primary | ICD-10-CM

## 2021-07-18 DIAGNOSIS — Z98.891 S/P CESAREAN SECTION: ICD-10-CM

## 2021-07-18 PROBLEM — Z37.9 NORMAL LABOR: Status: ACTIVE | Noted: 2021-07-18

## 2021-07-18 LAB
ABO/RH(D): NORMAL
ABO/RH(D): NORMAL
ANTIBODY SCREEN: NEGATIVE
ANTIBODY SCREEN: NEGATIVE
BASOPHILS # BLD AUTO: 0 10E3/UL (ref 0–0.2)
BASOPHILS NFR BLD AUTO: 0 %
EOSINOPHIL # BLD AUTO: 0.1 10E3/UL (ref 0–0.7)
EOSINOPHIL NFR BLD AUTO: 1 %
ERYTHROCYTE [DISTWIDTH] IN BLOOD BY AUTOMATED COUNT: 12.5 % (ref 10–15)
HCT VFR BLD AUTO: 37.9 % (ref 35–47)
HGB BLD-MCNC: 13.2 G/DL (ref 11.7–15.7)
IMM GRANULOCYTES # BLD: 0 10E3/UL
IMM GRANULOCYTES NFR BLD: 0 %
LYMPHOCYTES # BLD AUTO: 2.4 10E3/UL (ref 0.8–5.3)
LYMPHOCYTES NFR BLD AUTO: 21 %
MCH RBC QN AUTO: 30 PG (ref 26.5–33)
MCHC RBC AUTO-ENTMCNC: 34.8 G/DL (ref 31.5–36.5)
MCV RBC AUTO: 86 FL (ref 78–100)
MONOCYTES # BLD AUTO: 0.9 10E3/UL (ref 0–1.3)
MONOCYTES NFR BLD AUTO: 8 %
NEUTROPHILS # BLD AUTO: 8 10E3/UL (ref 1.6–8.3)
NEUTROPHILS NFR BLD AUTO: 70 %
NRBC # BLD AUTO: 0 10E3/UL
NRBC BLD AUTO-RTO: 0 /100
PLATELET # BLD AUTO: 231 10E3/UL (ref 150–450)
RBC # BLD AUTO: 4.4 10E6/UL (ref 3.8–5.2)
SARS-COV-2 RNA RESP QL NAA+PROBE: POSITIVE
SPECIMEN EXPIRATION DATE: NORMAL
SPECIMEN EXPIRATION DATE: NORMAL
WBC # BLD AUTO: 11.5 10E3/UL (ref 4–11)

## 2021-07-18 PROCEDURE — 59510 CESAREAN DELIVERY: CPT | Performed by: NURSE ANESTHETIST, CERTIFIED REGISTERED

## 2021-07-18 PROCEDURE — 360N000076 HC SURGERY LEVEL 3, PER MIN: Performed by: OBSTETRICS & GYNECOLOGY

## 2021-07-18 PROCEDURE — 250N000009 HC RX 250: Performed by: OBSTETRICS & GYNECOLOGY

## 2021-07-18 PROCEDURE — 710N000010 HC RECOVERY PHASE 1, LEVEL 2, PER MIN: Performed by: OBSTETRICS & GYNECOLOGY

## 2021-07-18 PROCEDURE — 36415 COLL VENOUS BLD VENIPUNCTURE: CPT | Performed by: OBSTETRICS & GYNECOLOGY

## 2021-07-18 PROCEDURE — 258N000003 HC RX IP 258 OP 636: Performed by: OBSTETRICS & GYNECOLOGY

## 2021-07-18 PROCEDURE — 370N000017 HC ANESTHESIA TECHNICAL FEE, PER MIN: Performed by: OBSTETRICS & GYNECOLOGY

## 2021-07-18 PROCEDURE — 120N000001 HC R&B MED SURG/OB

## 2021-07-18 PROCEDURE — 86780 TREPONEMA PALLIDUM: CPT | Performed by: OBSTETRICS & GYNECOLOGY

## 2021-07-18 PROCEDURE — 250N000011 HC RX IP 250 OP 636: Performed by: NURSE ANESTHETIST, CERTIFIED REGISTERED

## 2021-07-18 PROCEDURE — 258N000003 HC RX IP 258 OP 636: Performed by: NURSE ANESTHETIST, CERTIFIED REGISTERED

## 2021-07-18 PROCEDURE — 64488 TAP BLOCK BI INJECTION: CPT | Mod: XU | Performed by: NURSE ANESTHETIST, CERTIFIED REGISTERED

## 2021-07-18 PROCEDURE — 86900 BLOOD TYPING SEROLOGIC ABO: CPT | Performed by: OBSTETRICS & GYNECOLOGY

## 2021-07-18 PROCEDURE — 59510 CESAREAN DELIVERY: CPT | Performed by: OBSTETRICS & GYNECOLOGY

## 2021-07-18 PROCEDURE — U0005 INFEC AGEN DETEC AMPLI PROBE: HCPCS | Performed by: OBSTETRICS & GYNECOLOGY

## 2021-07-18 PROCEDURE — 85025 COMPLETE CBC W/AUTO DIFF WBC: CPT | Performed by: OBSTETRICS & GYNECOLOGY

## 2021-07-18 PROCEDURE — 272N000001 HC OR GENERAL SUPPLY STERILE: Performed by: OBSTETRICS & GYNECOLOGY

## 2021-07-18 PROCEDURE — 250N000009 HC RX 250: Performed by: NURSE ANESTHETIST, CERTIFIED REGISTERED

## 2021-07-18 RX ORDER — ONDANSETRON 2 MG/ML
4 INJECTION INTRAMUSCULAR; INTRAVENOUS EVERY 6 HOURS PRN
Status: DISCONTINUED | OUTPATIENT
Start: 2021-07-18 | End: 2021-07-18 | Stop reason: HOSPADM

## 2021-07-18 RX ORDER — LIDOCAINE 40 MG/G
CREAM TOPICAL
Status: DISCONTINUED | OUTPATIENT
Start: 2021-07-18 | End: 2021-07-20 | Stop reason: HOSPADM

## 2021-07-18 RX ORDER — IBUPROFEN 400 MG/1
800 TABLET, FILM COATED ORAL EVERY 6 HOURS
Status: DISCONTINUED | OUTPATIENT
Start: 2021-07-19 | End: 2021-07-20 | Stop reason: HOSPADM

## 2021-07-18 RX ORDER — OXYTOCIN/0.9 % SODIUM CHLORIDE 30/500 ML
340 PLASTIC BAG, INJECTION (ML) INTRAVENOUS CONTINUOUS PRN
Status: DISCONTINUED | OUTPATIENT
Start: 2021-07-18 | End: 2021-07-18 | Stop reason: CLARIF

## 2021-07-18 RX ORDER — TRANEXAMIC ACID 10 MG/ML
1 INJECTION, SOLUTION INTRAVENOUS EVERY 30 MIN PRN
Status: DISCONTINUED | OUTPATIENT
Start: 2021-07-18 | End: 2021-07-20 | Stop reason: HOSPADM

## 2021-07-18 RX ORDER — PROCHLORPERAZINE 25 MG
25 SUPPOSITORY, RECTAL RECTAL EVERY 12 HOURS PRN
Status: DISCONTINUED | OUTPATIENT
Start: 2021-07-18 | End: 2021-07-20 | Stop reason: HOSPADM

## 2021-07-18 RX ORDER — SIMETHICONE 80 MG
80 TABLET,CHEWABLE ORAL 4 TIMES DAILY PRN
Status: DISCONTINUED | OUTPATIENT
Start: 2021-07-18 | End: 2021-07-20 | Stop reason: HOSPADM

## 2021-07-18 RX ORDER — ACETAMINOPHEN 325 MG/1
975 TABLET ORAL EVERY 6 HOURS
Status: DISCONTINUED | OUTPATIENT
Start: 2021-07-19 | End: 2021-07-20 | Stop reason: HOSPADM

## 2021-07-18 RX ORDER — PROCHLORPERAZINE MALEATE 10 MG
10 TABLET ORAL EVERY 6 HOURS PRN
Status: DISCONTINUED | OUTPATIENT
Start: 2021-07-18 | End: 2021-07-18 | Stop reason: HOSPADM

## 2021-07-18 RX ORDER — OXYTOCIN 10 [USP'U]/ML
10 INJECTION, SOLUTION INTRAMUSCULAR; INTRAVENOUS
Status: DISCONTINUED | OUTPATIENT
Start: 2021-07-18 | End: 2021-07-20 | Stop reason: HOSPADM

## 2021-07-18 RX ORDER — METOCLOPRAMIDE HYDROCHLORIDE 5 MG/ML
10 INJECTION INTRAMUSCULAR; INTRAVENOUS EVERY 6 HOURS PRN
Status: DISCONTINUED | OUTPATIENT
Start: 2021-07-18 | End: 2021-07-18 | Stop reason: HOSPADM

## 2021-07-18 RX ORDER — BUPIVACAINE HYDROCHLORIDE 2.5 MG/ML
INJECTION, SOLUTION EPIDURAL; INFILTRATION; INTRACAUDAL PRN
Status: DISCONTINUED | OUTPATIENT
Start: 2021-07-18 | End: 2021-07-18

## 2021-07-18 RX ORDER — ONDANSETRON 4 MG/1
4 TABLET, ORALLY DISINTEGRATING ORAL EVERY 6 HOURS PRN
Status: DISCONTINUED | OUTPATIENT
Start: 2021-07-18 | End: 2021-07-18 | Stop reason: HOSPADM

## 2021-07-18 RX ORDER — ONDANSETRON 2 MG/ML
4 INJECTION INTRAMUSCULAR; INTRAVENOUS EVERY 6 HOURS PRN
Status: DISCONTINUED | OUTPATIENT
Start: 2021-07-18 | End: 2021-07-20 | Stop reason: HOSPADM

## 2021-07-18 RX ORDER — MODIFIED LANOLIN
OINTMENT (GRAM) TOPICAL
Status: DISCONTINUED | OUTPATIENT
Start: 2021-07-18 | End: 2021-07-20 | Stop reason: HOSPADM

## 2021-07-18 RX ORDER — TRANEXAMIC ACID 10 MG/ML
1 INJECTION, SOLUTION INTRAVENOUS EVERY 30 MIN PRN
Status: DISCONTINUED | OUTPATIENT
Start: 2021-07-18 | End: 2021-07-19 | Stop reason: HOSPADM

## 2021-07-18 RX ORDER — FENTANYL CITRATE 50 UG/ML
INJECTION, SOLUTION INTRAMUSCULAR; INTRAVENOUS PRN
Status: DISCONTINUED | OUTPATIENT
Start: 2021-07-18 | End: 2021-07-18

## 2021-07-18 RX ORDER — DIPHENHYDRAMINE HCL 25 MG
25 CAPSULE ORAL EVERY 6 HOURS PRN
Status: DISCONTINUED | OUTPATIENT
Start: 2021-07-18 | End: 2021-07-18

## 2021-07-18 RX ORDER — METOCLOPRAMIDE 10 MG/1
10 TABLET ORAL EVERY 6 HOURS PRN
Status: DISCONTINUED | OUTPATIENT
Start: 2021-07-18 | End: 2021-07-18 | Stop reason: HOSPADM

## 2021-07-18 RX ORDER — OXYTOCIN/0.9 % SODIUM CHLORIDE 30/500 ML
340 PLASTIC BAG, INJECTION (ML) INTRAVENOUS CONTINUOUS PRN
Status: DISCONTINUED | OUTPATIENT
Start: 2021-07-18 | End: 2021-07-20 | Stop reason: HOSPADM

## 2021-07-18 RX ORDER — MAGNESIUM HYDROXIDE 1200 MG/15ML
LIQUID ORAL PRN
Status: DISCONTINUED | OUTPATIENT
Start: 2021-07-18 | End: 2021-07-18 | Stop reason: HOSPADM

## 2021-07-18 RX ORDER — KETOROLAC TROMETHAMINE 30 MG/ML
30 INJECTION, SOLUTION INTRAMUSCULAR; INTRAVENOUS EVERY 6 HOURS
Status: COMPLETED | OUTPATIENT
Start: 2021-07-19 | End: 2021-07-19

## 2021-07-18 RX ORDER — METHYLERGONOVINE MALEATE 0.2 MG/ML
200 INJECTION INTRAVENOUS
Status: DISCONTINUED | OUTPATIENT
Start: 2021-07-18 | End: 2021-07-18 | Stop reason: CLARIF

## 2021-07-18 RX ORDER — NALOXONE HYDROCHLORIDE 0.4 MG/ML
0.4 INJECTION, SOLUTION INTRAMUSCULAR; INTRAVENOUS; SUBCUTANEOUS
Status: DISCONTINUED | OUTPATIENT
Start: 2021-07-18 | End: 2021-07-18 | Stop reason: HOSPADM

## 2021-07-18 RX ORDER — OXYTOCIN 10 [USP'U]/ML
10 INJECTION, SOLUTION INTRAMUSCULAR; INTRAVENOUS
Status: DISCONTINUED | OUTPATIENT
Start: 2021-07-18 | End: 2021-07-18 | Stop reason: HOSPADM

## 2021-07-18 RX ORDER — METOCLOPRAMIDE 10 MG/1
10 TABLET ORAL EVERY 6 HOURS PRN
Status: DISCONTINUED | OUTPATIENT
Start: 2021-07-18 | End: 2021-07-20 | Stop reason: HOSPADM

## 2021-07-18 RX ORDER — ONDANSETRON 2 MG/ML
4 INJECTION INTRAMUSCULAR; INTRAVENOUS EVERY 30 MIN PRN
Status: DISCONTINUED | OUTPATIENT
Start: 2021-07-18 | End: 2021-07-18

## 2021-07-18 RX ORDER — CARBOPROST TROMETHAMINE 250 UG/ML
250 INJECTION, SOLUTION INTRAMUSCULAR
Status: DISCONTINUED | OUTPATIENT
Start: 2021-07-18 | End: 2021-07-20 | Stop reason: HOSPADM

## 2021-07-18 RX ORDER — MISOPROSTOL 100 UG/1
400 TABLET ORAL
Status: DISCONTINUED | OUTPATIENT
Start: 2021-07-18 | End: 2021-07-18 | Stop reason: HOSPADM

## 2021-07-18 RX ORDER — OXYTOCIN 10 [USP'U]/ML
10 INJECTION, SOLUTION INTRAMUSCULAR; INTRAVENOUS
Status: DISCONTINUED | OUTPATIENT
Start: 2021-07-18 | End: 2021-07-19

## 2021-07-18 RX ORDER — KETOROLAC TROMETHAMINE 30 MG/ML
INJECTION, SOLUTION INTRAMUSCULAR; INTRAVENOUS PRN
Status: DISCONTINUED | OUTPATIENT
Start: 2021-07-18 | End: 2021-07-18

## 2021-07-18 RX ORDER — METOCLOPRAMIDE HYDROCHLORIDE 5 MG/ML
10 INJECTION INTRAMUSCULAR; INTRAVENOUS EVERY 6 HOURS PRN
Status: DISCONTINUED | OUTPATIENT
Start: 2021-07-18 | End: 2021-07-20 | Stop reason: HOSPADM

## 2021-07-18 RX ORDER — ONDANSETRON 2 MG/ML
INJECTION INTRAMUSCULAR; INTRAVENOUS PRN
Status: DISCONTINUED | OUTPATIENT
Start: 2021-07-18 | End: 2021-07-18

## 2021-07-18 RX ORDER — METHYLERGONOVINE MALEATE 0.2 MG/ML
200 INJECTION INTRAVENOUS
Status: DISCONTINUED | OUTPATIENT
Start: 2021-07-18 | End: 2021-07-20 | Stop reason: HOSPADM

## 2021-07-18 RX ORDER — AMOXICILLIN 250 MG
1 CAPSULE ORAL 2 TIMES DAILY
Status: DISCONTINUED | OUTPATIENT
Start: 2021-07-18 | End: 2021-07-20 | Stop reason: HOSPADM

## 2021-07-18 RX ORDER — LIDOCAINE 40 MG/G
CREAM TOPICAL
Status: DISCONTINUED | OUTPATIENT
Start: 2021-07-18 | End: 2021-07-18 | Stop reason: CLARIF

## 2021-07-18 RX ORDER — OXYTOCIN/0.9 % SODIUM CHLORIDE 30/500 ML
PLASTIC BAG, INJECTION (ML) INTRAVENOUS CONTINUOUS PRN
Status: DISCONTINUED | OUTPATIENT
Start: 2021-07-18 | End: 2021-07-18

## 2021-07-18 RX ORDER — NALOXONE HYDROCHLORIDE 0.4 MG/ML
0.2 INJECTION, SOLUTION INTRAMUSCULAR; INTRAVENOUS; SUBCUTANEOUS
Status: DISCONTINUED | OUTPATIENT
Start: 2021-07-18 | End: 2021-07-20 | Stop reason: HOSPADM

## 2021-07-18 RX ORDER — CEFAZOLIN SODIUM 2 G/100ML
INJECTION, SOLUTION INTRAVENOUS PRN
Status: DISCONTINUED | OUTPATIENT
Start: 2021-07-18 | End: 2021-07-18

## 2021-07-18 RX ORDER — TRANEXAMIC ACID 10 MG/ML
1 INJECTION, SOLUTION INTRAVENOUS EVERY 30 MIN PRN
Status: DISCONTINUED | OUTPATIENT
Start: 2021-07-18 | End: 2021-07-18 | Stop reason: HOSPADM

## 2021-07-18 RX ORDER — CITRIC ACID/SODIUM CITRATE 334-500MG
30 SOLUTION, ORAL ORAL
Status: DISCONTINUED | OUTPATIENT
Start: 2021-07-18 | End: 2021-07-18 | Stop reason: CLARIF

## 2021-07-18 RX ORDER — SODIUM CHLORIDE, SODIUM LACTATE, POTASSIUM CHLORIDE, CALCIUM CHLORIDE 600; 310; 30; 20 MG/100ML; MG/100ML; MG/100ML; MG/100ML
INJECTION, SOLUTION INTRAVENOUS CONTINUOUS
Status: DISCONTINUED | OUTPATIENT
Start: 2021-07-18 | End: 2021-07-19 | Stop reason: HOSPADM

## 2021-07-18 RX ORDER — DEXAMETHASONE SODIUM PHOSPHATE 10 MG/ML
INJECTION, SOLUTION INTRAMUSCULAR; INTRAVENOUS PRN
Status: DISCONTINUED | OUTPATIENT
Start: 2021-07-18 | End: 2021-07-18

## 2021-07-18 RX ORDER — SODIUM CHLORIDE, SODIUM LACTATE, POTASSIUM CHLORIDE, CALCIUM CHLORIDE 600; 310; 30; 20 MG/100ML; MG/100ML; MG/100ML; MG/100ML
INJECTION, SOLUTION INTRAVENOUS CONTINUOUS
Status: DISCONTINUED | OUTPATIENT
Start: 2021-07-18 | End: 2021-07-18 | Stop reason: HOSPADM

## 2021-07-18 RX ORDER — HYDROMORPHONE HYDROCHLORIDE 1 MG/ML
0.2 INJECTION, SOLUTION INTRAMUSCULAR; INTRAVENOUS; SUBCUTANEOUS EVERY 5 MIN PRN
Status: DISCONTINUED | OUTPATIENT
Start: 2021-07-18 | End: 2021-07-19 | Stop reason: HOSPADM

## 2021-07-18 RX ORDER — DEXTROSE, SODIUM CHLORIDE, SODIUM LACTATE, POTASSIUM CHLORIDE, AND CALCIUM CHLORIDE 5; .6; .31; .03; .02 G/100ML; G/100ML; G/100ML; G/100ML; G/100ML
INJECTION, SOLUTION INTRAVENOUS CONTINUOUS
Status: DISCONTINUED | OUTPATIENT
Start: 2021-07-18 | End: 2021-07-19

## 2021-07-18 RX ORDER — ONDANSETRON 4 MG/1
4 TABLET, ORALLY DISINTEGRATING ORAL EVERY 30 MIN PRN
Status: DISCONTINUED | OUTPATIENT
Start: 2021-07-18 | End: 2021-07-18

## 2021-07-18 RX ORDER — MISOPROSTOL 100 UG/1
25 TABLET ORAL EVERY 4 HOURS PRN
Status: DISCONTINUED | OUTPATIENT
Start: 2021-07-18 | End: 2021-07-18 | Stop reason: HOSPADM

## 2021-07-18 RX ORDER — CEFAZOLIN SODIUM 2 G/100ML
2 INJECTION, SOLUTION INTRAVENOUS SEE ADMIN INSTRUCTIONS
Status: DISCONTINUED | OUTPATIENT
Start: 2021-07-18 | End: 2021-07-18 | Stop reason: CLARIF

## 2021-07-18 RX ORDER — CARBOPROST TROMETHAMINE 250 UG/ML
250 INJECTION, SOLUTION INTRAMUSCULAR
Status: DISCONTINUED | OUTPATIENT
Start: 2021-07-18 | End: 2021-07-18 | Stop reason: CLARIF

## 2021-07-18 RX ORDER — PROCHLORPERAZINE 25 MG
25 SUPPOSITORY, RECTAL RECTAL EVERY 12 HOURS PRN
Status: DISCONTINUED | OUTPATIENT
Start: 2021-07-18 | End: 2021-07-18 | Stop reason: HOSPADM

## 2021-07-18 RX ORDER — KETOROLAC TROMETHAMINE 30 MG/ML
30 INJECTION, SOLUTION INTRAMUSCULAR; INTRAVENOUS
Status: DISCONTINUED | OUTPATIENT
Start: 2021-07-18 | End: 2021-07-19

## 2021-07-18 RX ORDER — BISACODYL 10 MG
10 SUPPOSITORY, RECTAL RECTAL DAILY PRN
Status: DISCONTINUED | OUTPATIENT
Start: 2021-07-20 | End: 2021-07-20 | Stop reason: HOSPADM

## 2021-07-18 RX ORDER — MISOPROSTOL 100 UG/1
400 TABLET ORAL
Status: DISCONTINUED | OUTPATIENT
Start: 2021-07-18 | End: 2021-07-20 | Stop reason: HOSPADM

## 2021-07-18 RX ORDER — MISOPROSTOL 100 UG/1
400 TABLET ORAL
Status: DISCONTINUED | OUTPATIENT
Start: 2021-07-18 | End: 2021-07-18 | Stop reason: CLARIF

## 2021-07-18 RX ORDER — ONDANSETRON 4 MG/1
4 TABLET, ORALLY DISINTEGRATING ORAL EVERY 6 HOURS PRN
Status: DISCONTINUED | OUTPATIENT
Start: 2021-07-18 | End: 2021-07-20 | Stop reason: HOSPADM

## 2021-07-18 RX ORDER — HYDROCORTISONE 2.5 %
CREAM (GRAM) TOPICAL 3 TIMES DAILY PRN
Status: DISCONTINUED | OUTPATIENT
Start: 2021-07-18 | End: 2021-07-20 | Stop reason: HOSPADM

## 2021-07-18 RX ORDER — OXYTOCIN/0.9 % SODIUM CHLORIDE 30/500 ML
100-340 PLASTIC BAG, INJECTION (ML) INTRAVENOUS CONTINUOUS PRN
Status: DISCONTINUED | OUTPATIENT
Start: 2021-07-18 | End: 2021-07-19

## 2021-07-18 RX ORDER — AMOXICILLIN 250 MG
2 CAPSULE ORAL 2 TIMES DAILY
Status: DISCONTINUED | OUTPATIENT
Start: 2021-07-18 | End: 2021-07-20 | Stop reason: HOSPADM

## 2021-07-18 RX ORDER — NALOXONE HYDROCHLORIDE 0.4 MG/ML
0.4 INJECTION, SOLUTION INTRAMUSCULAR; INTRAVENOUS; SUBCUTANEOUS
Status: DISCONTINUED | OUTPATIENT
Start: 2021-07-18 | End: 2021-07-20 | Stop reason: HOSPADM

## 2021-07-18 RX ORDER — DIPHENHYDRAMINE HYDROCHLORIDE 50 MG/ML
25 INJECTION INTRAMUSCULAR; INTRAVENOUS EVERY 6 HOURS PRN
Status: DISCONTINUED | OUTPATIENT
Start: 2021-07-18 | End: 2021-07-18

## 2021-07-18 RX ORDER — MORPHINE SULFATE 0.5 MG/ML
INJECTION, SOLUTION EPIDURAL; INTRATHECAL; INTRAVENOUS PRN
Status: DISCONTINUED | OUTPATIENT
Start: 2021-07-18 | End: 2021-07-18

## 2021-07-18 RX ORDER — FENTANYL CITRATE 50 UG/ML
50 INJECTION, SOLUTION INTRAMUSCULAR; INTRAVENOUS EVERY 5 MIN PRN
Status: DISCONTINUED | OUTPATIENT
Start: 2021-07-18 | End: 2021-07-19 | Stop reason: HOSPADM

## 2021-07-18 RX ORDER — NALOXONE HYDROCHLORIDE 0.4 MG/ML
0.2 INJECTION, SOLUTION INTRAMUSCULAR; INTRAVENOUS; SUBCUTANEOUS
Status: DISCONTINUED | OUTPATIENT
Start: 2021-07-18 | End: 2021-07-18 | Stop reason: HOSPADM

## 2021-07-18 RX ORDER — LIDOCAINE HYDROCHLORIDE 10 MG/ML
INJECTION, SOLUTION INFILTRATION; PERINEURAL PRN
Status: DISCONTINUED | OUTPATIENT
Start: 2021-07-18 | End: 2021-07-18

## 2021-07-18 RX ORDER — SODIUM CHLORIDE, SODIUM LACTATE, POTASSIUM CHLORIDE, CALCIUM CHLORIDE 600; 310; 30; 20 MG/100ML; MG/100ML; MG/100ML; MG/100ML
INJECTION, SOLUTION INTRAVENOUS CONTINUOUS
Status: DISCONTINUED | OUTPATIENT
Start: 2021-07-18 | End: 2021-07-18 | Stop reason: CLARIF

## 2021-07-18 RX ORDER — CEFAZOLIN SODIUM 2 G/100ML
2 INJECTION, SOLUTION INTRAVENOUS
Status: DISCONTINUED | OUTPATIENT
Start: 2021-07-18 | End: 2021-07-18 | Stop reason: CLARIF

## 2021-07-18 RX ORDER — OXYTOCIN 10 [USP'U]/ML
10 INJECTION, SOLUTION INTRAMUSCULAR; INTRAVENOUS
Status: DISCONTINUED | OUTPATIENT
Start: 2021-07-18 | End: 2021-07-18 | Stop reason: CLARIF

## 2021-07-18 RX ORDER — BUPIVACAINE HYDROCHLORIDE 5 MG/ML
INJECTION, SOLUTION PERINEURAL PRN
Status: DISCONTINUED | OUTPATIENT
Start: 2021-07-18 | End: 2021-07-18 | Stop reason: HOSPADM

## 2021-07-18 RX ORDER — CARBOPROST TROMETHAMINE 250 UG/ML
250 INJECTION, SOLUTION INTRAMUSCULAR
Status: DISCONTINUED | OUTPATIENT
Start: 2021-07-18 | End: 2021-07-18 | Stop reason: HOSPADM

## 2021-07-18 RX ORDER — FENTANYL CITRATE 50 UG/ML
50-100 INJECTION, SOLUTION INTRAMUSCULAR; INTRAVENOUS
Status: DISCONTINUED | OUTPATIENT
Start: 2021-07-18 | End: 2021-07-18 | Stop reason: HOSPADM

## 2021-07-18 RX ORDER — OXYTOCIN/0.9 % SODIUM CHLORIDE 30/500 ML
340 PLASTIC BAG, INJECTION (ML) INTRAVENOUS CONTINUOUS PRN
Status: DISCONTINUED | OUTPATIENT
Start: 2021-07-18 | End: 2021-07-18 | Stop reason: HOSPADM

## 2021-07-18 RX ORDER — PROCHLORPERAZINE MALEATE 10 MG
10 TABLET ORAL EVERY 6 HOURS PRN
Status: DISCONTINUED | OUTPATIENT
Start: 2021-07-18 | End: 2021-07-20 | Stop reason: HOSPADM

## 2021-07-18 RX ORDER — IBUPROFEN 600 MG/1
600 TABLET, FILM COATED ORAL
Status: DISCONTINUED | OUTPATIENT
Start: 2021-07-18 | End: 2021-07-19

## 2021-07-18 RX ORDER — ACETAMINOPHEN 325 MG/1
975 TABLET ORAL ONCE
Status: DISCONTINUED | OUTPATIENT
Start: 2021-07-18 | End: 2021-07-18

## 2021-07-18 RX ORDER — BUPIVACAINE HYDROCHLORIDE 7.5 MG/ML
INJECTION, SOLUTION INTRASPINAL PRN
Status: DISCONTINUED | OUTPATIENT
Start: 2021-07-18 | End: 2021-07-18

## 2021-07-18 RX ORDER — OXYCODONE HYDROCHLORIDE 5 MG/1
10 TABLET ORAL EVERY 4 HOURS PRN
Status: DISCONTINUED | OUTPATIENT
Start: 2021-07-18 | End: 2021-07-20 | Stop reason: HOSPADM

## 2021-07-18 RX ORDER — ACETAMINOPHEN 325 MG/1
975 TABLET ORAL ONCE
Status: DISCONTINUED | OUTPATIENT
Start: 2021-07-18 | End: 2021-07-18 | Stop reason: CLARIF

## 2021-07-18 RX ORDER — OXYTOCIN/0.9 % SODIUM CHLORIDE 30/500 ML
100-340 PLASTIC BAG, INJECTION (ML) INTRAVENOUS CONTINUOUS PRN
Status: DISCONTINUED | OUTPATIENT
Start: 2021-07-18 | End: 2021-07-20 | Stop reason: HOSPADM

## 2021-07-18 RX ORDER — METHYLERGONOVINE MALEATE 0.2 MG/ML
200 INJECTION INTRAVENOUS
Status: DISCONTINUED | OUTPATIENT
Start: 2021-07-18 | End: 2021-07-18 | Stop reason: HOSPADM

## 2021-07-18 RX ORDER — OXYCODONE HYDROCHLORIDE 5 MG/1
5 TABLET ORAL EVERY 4 HOURS PRN
Status: DISCONTINUED | OUTPATIENT
Start: 2021-07-18 | End: 2021-07-20 | Stop reason: HOSPADM

## 2021-07-18 RX ADMIN — ONDANSETRON 4 MG: 2 INJECTION INTRAMUSCULAR; INTRAVENOUS at 21:46

## 2021-07-18 RX ADMIN — LIDOCAINE HYDROCHLORIDE 3 ML: 10 INJECTION, SOLUTION INFILTRATION; PERINEURAL at 21:11

## 2021-07-18 RX ADMIN — Medication 300 ML/HR: at 21:30

## 2021-07-18 RX ADMIN — SODIUM CHLORIDE 25 MCG: 0.9 INJECTION, SOLUTION INTRAVENOUS at 22:20

## 2021-07-18 RX ADMIN — BUPIVACAINE HYDROCHLORIDE 1.6 ML: 7.5 INJECTION, SOLUTION INTRASPINAL at 21:12

## 2021-07-18 RX ADMIN — SODIUM CHLORIDE, POTASSIUM CHLORIDE, SODIUM LACTATE AND CALCIUM CHLORIDE: 600; 310; 30; 20 INJECTION, SOLUTION INTRAVENOUS at 21:05

## 2021-07-18 RX ADMIN — SODIUM CHLORIDE, POTASSIUM CHLORIDE, SODIUM LACTATE AND CALCIUM CHLORIDE 1000 ML: 600; 310; 30; 20 INJECTION, SOLUTION INTRAVENOUS at 20:16

## 2021-07-18 RX ADMIN — BUPIVACAINE HYDROCHLORIDE 15 ML: 2.5 INJECTION, SOLUTION EPIDURAL; INFILTRATION; INTRACAUDAL; PERINEURAL at 22:20

## 2021-07-18 RX ADMIN — FENTANYL CITRATE 50 MCG: 50 INJECTION, SOLUTION INTRAMUSCULAR; INTRAVENOUS at 21:56

## 2021-07-18 RX ADMIN — SODIUM CHLORIDE, POTASSIUM CHLORIDE, SODIUM LACTATE AND CALCIUM CHLORIDE 1000 ML: 600; 310; 30; 20 INJECTION, SOLUTION INTRAVENOUS at 20:17

## 2021-07-18 RX ADMIN — FENTANYL CITRATE 15 MCG: 50 INJECTION, SOLUTION INTRAMUSCULAR; INTRAVENOUS at 21:12

## 2021-07-18 RX ADMIN — PHENYLEPHRINE HYDROCHLORIDE 0.5 MCG/KG/MIN: 10 INJECTION INTRAVENOUS at 21:13

## 2021-07-18 RX ADMIN — CEFAZOLIN SODIUM 2 G: 2 INJECTION, SOLUTION INTRAVENOUS at 21:15

## 2021-07-18 RX ADMIN — SODIUM CHLORIDE, POTASSIUM CHLORIDE, SODIUM LACTATE AND CALCIUM CHLORIDE: 600; 310; 30; 20 INJECTION, SOLUTION INTRAVENOUS at 21:27

## 2021-07-18 RX ADMIN — KETOROLAC TROMETHAMINE 30 MG: 30 INJECTION, SOLUTION INTRAMUSCULAR at 21:45

## 2021-07-18 RX ADMIN — DEXAMETHASONE SODIUM PHOSPHATE 5 MG: 10 INJECTION, SOLUTION INTRAMUSCULAR; INTRAVENOUS at 22:15

## 2021-07-18 RX ADMIN — SODIUM CHLORIDE 25 MCG: 0.9 INJECTION, SOLUTION INTRAVENOUS at 22:15

## 2021-07-18 RX ADMIN — DEXAMETHASONE SODIUM PHOSPHATE 5 MG: 10 INJECTION, SOLUTION INTRAMUSCULAR; INTRAVENOUS at 22:20

## 2021-07-18 RX ADMIN — MORPHINE SULFATE 0.1 MG: 0.5 INJECTION, SOLUTION EPIDURAL; INTRATHECAL; INTRAVENOUS at 21:12

## 2021-07-18 RX ADMIN — BUPIVACAINE HYDROCHLORIDE 15 ML: 2.5 INJECTION, SOLUTION EPIDURAL; INFILTRATION; INTRACAUDAL; PERINEURAL at 22:15

## 2021-07-18 RX ADMIN — FENTANYL CITRATE 50 MCG: 50 INJECTION, SOLUTION INTRAMUSCULAR; INTRAVENOUS at 22:08

## 2021-07-18 ASSESSMENT — ACTIVITIES OF DAILY LIVING (ADL)
FALL_HISTORY_WITHIN_LAST_SIX_MONTHS: NO
TOILETING_ISSUES: NO

## 2021-07-18 NOTE — PROGRESS NOTES
Plan of care reviewed with patient and significant other, patient is agreeable to discharge home. Instructions reviewed on when to call or return for further cares, patient verbalizes understanding.

## 2021-07-18 NOTE — PROGRESS NOTES
Pt admitted to room 401 for ripening. Pt placed on EFM. Pt having contractions every 5-7 minutes, pt states she is not feeling them. Moderate variability noted, with accelerations. VSS. MD placed orders, verbal order to place first dose of Cytotec at 2200.  Renetta Singh RN on 7/18/2021 at 6:41 PM

## 2021-07-18 NOTE — DISCHARGE INSTRUCTIONS
Call/return to [facility] with leaking fluid, bleeding, decreased fetal movement or contractions 3-5 minutes apart lasting 60-90 seconds. [facility] phone number [phone].

## 2021-07-18 NOTE — PROGRESS NOTES
Patient arrived to [facility] with spouse for c/o HA, feeling unwell and /84. EUM/EFM applied, cervix closed. MD notified of patient arrival and complaints, per MD monitor BP and okay to give 650mg PO tylenol. Per provider if BP are stable patient is okay to go home.

## 2021-07-19 LAB
HGB BLD-MCNC: 11.9 G/DL (ref 11.7–15.7)
T PALLIDUM AB SER QL: NONREACTIVE

## 2021-07-19 PROCEDURE — 250N000013 HC RX MED GY IP 250 OP 250 PS 637: Performed by: OBSTETRICS & GYNECOLOGY

## 2021-07-19 PROCEDURE — 120N000001 HC R&B MED SURG/OB

## 2021-07-19 PROCEDURE — 99207 PR NO CHARGE LOS: CPT | Performed by: OBSTETRICS & GYNECOLOGY

## 2021-07-19 PROCEDURE — 250N000011 HC RX IP 250 OP 636: Performed by: OBSTETRICS & GYNECOLOGY

## 2021-07-19 PROCEDURE — 36415 COLL VENOUS BLD VENIPUNCTURE: CPT | Performed by: OBSTETRICS & GYNECOLOGY

## 2021-07-19 PROCEDURE — 85018 HEMOGLOBIN: CPT | Performed by: OBSTETRICS & GYNECOLOGY

## 2021-07-19 RX ADMIN — ACETAMINOPHEN 975 MG: 325 TABLET, FILM COATED ORAL at 17:57

## 2021-07-19 RX ADMIN — DOCUSATE SODIUM 50 MG AND SENNOSIDES 8.6 MG 1 TABLET: 8.6; 5 TABLET, FILM COATED ORAL at 08:00

## 2021-07-19 RX ADMIN — DOCUSATE SODIUM 50 MG AND SENNOSIDES 8.6 MG 1 TABLET: 8.6; 5 TABLET, FILM COATED ORAL at 21:04

## 2021-07-19 RX ADMIN — SODIUM CHLORIDE, SODIUM LACTATE, POTASSIUM CHLORIDE, CALCIUM CHLORIDE AND DEXTROSE MONOHYDRATE 1000 ML: 5; 600; 310; 30; 20 INJECTION, SOLUTION INTRAVENOUS at 00:37

## 2021-07-19 RX ADMIN — ACETAMINOPHEN 975 MG: 325 TABLET, FILM COATED ORAL at 00:40

## 2021-07-19 RX ADMIN — ACETAMINOPHEN 975 MG: 325 TABLET, FILM COATED ORAL at 06:40

## 2021-07-19 RX ADMIN — KETOROLAC TROMETHAMINE 30 MG: 30 INJECTION, SOLUTION INTRAMUSCULAR; INTRAVENOUS at 04:32

## 2021-07-19 RX ADMIN — IBUPROFEN 800 MG: 400 TABLET ORAL at 21:04

## 2021-07-19 RX ADMIN — ACETAMINOPHEN 975 MG: 325 TABLET, FILM COATED ORAL at 12:27

## 2021-07-19 RX ADMIN — KETOROLAC TROMETHAMINE 30 MG: 30 INJECTION, SOLUTION INTRAMUSCULAR; INTRAVENOUS at 10:18

## 2021-07-19 RX ADMIN — KETOROLAC TROMETHAMINE 30 MG: 30 INJECTION, SOLUTION INTRAMUSCULAR; INTRAVENOUS at 15:44

## 2021-07-19 NOTE — PLAN OF CARE
Dania Bhatti is doing well after primary  section. Her dressing is still in place and does  have visible drainage on it. Small amount of dried drainage outlined on dressing, to be changed tomorrow. Fundus is firm with light bleeding and no clots. IV SL, tolerating regular diet well. Patient is breast feeding well. Patient has minimal amounts of pain that is well managed with oral analgesics. Patient is ambulating independently in room. She is voiding, spontaneously and without difficulty. Patient has verbalized understanding of routine cares and warning signs.

## 2021-07-19 NOTE — ANESTHESIA POSTPROCEDURE EVALUATION
Patient: Dania Bhatti    Procedure(s):   SECTION    Diagnosis:At risk for alteration in fetal status [Z91.89]  Diagnosis Additional Information: No value filed.    Anesthesia Type:  Epidural    Note:  Disposition: Inpatient   Postop Pain Control: Uneventful            Sign Out: Well controlled pain   PONV: No   Neuro/Psych: Uneventful            Sign Out: Acceptable/Baseline neuro status   Airway/Respiratory: Uneventful            Sign Out: Acceptable/Baseline resp. status   CV/Hemodynamics: Uneventful            Sign Out: Acceptable CV status; No obvious hypovolemia; No obvious fluid overload   Other NRE: NONE   DID A NON-ROUTINE EVENT OCCUR? No           Last vitals:  Vitals:    21 2245 21 2250 21 225   BP: 114/64 111/67    Pulse: 80 92 98   Resp: 17 24 20   Temp:   98.4  F (36.9  C)   SpO2: 95% 96% 97%       Last vitals prior to Anesthesia Care Transfer:  CRNA VITALS  2021 2155 - 2021 2255      2021             Resp Rate (set):  10          Electronically Signed By: NINI De La Paz CRNA  2021  11:08 PM

## 2021-07-19 NOTE — PROGRESS NOTES
Report from Abbie GRAY PACU RN. Resting in bed. IV infusing, patient denies pain. Abd dressing intact, small amount of lochia noted, fundus U/U. Barahona draining clear benjamin urine. She offers no complaints at this time. Will continue to monitor.

## 2021-07-19 NOTE — PROGRESS NOTES
[facility]    Obstetrics Post-Op / Progress Note    Assessment & Plan   Assessment:  -1 Day Post-Op  Procedure(s):   SECTION    Doing well.  Clean wound without signs of infection.  No immediate surgical complications identified.    Plan:  Monitor wound for signs of infection  Pain control measures as needed  Anticipate discharge in 2 days    Oh Hong     Interval History   Doing well.  Pain is well-controlled.  No fevers.  No history of wound drainage, warmth or significant erythema.  Good appetite.  Denies chest pain, shortness of breath, nausea or vomiting.  Ambulatory.  Breastfeeding well.    Medications     dextrose 5% lactated ringers 1,000 mL (21 0037)     - MEDICATION INSTRUCTIONS -       - MEDICATION INSTRUCTIONS -       oxytocin in 0.9% NaCl       oxytocin in 0.9% NaCl       oxytocin in 0.9% NaCl         acetaminophen  975 mg Oral Q6H     ibuprofen  800 mg Oral Q6H     ketorolac  30 mg Intravenous Q6H     senna-docusate  1 tablet Oral BID    Or     senna-docusate  2 tablet Oral BID     sodium chloride (PF)  3 mL Intracatheter Q8H       Physical Exam   Temp: 98.1  F (36.7  C) Temp src: Oral BP: 118/69 Pulse: 80   Resp: 18 SpO2: 99 % O2 Device: None (Room air)    There were no vitals filed for this visit.  Vital Signs with Ranges  Temp:  [97  F (36.1  C)-98.4  F (36.9  C)] 98.1  F (36.7  C)  Pulse:  [] 80  Resp:  [12-24] 18  BP: ()/(53-79) 118/69  SpO2:  [95 %-99 %] 99 %  I/O last 3 completed shifts:  In: 600 [I.V.:600]  Out: 1880 [Urine:1525; Blood:355]    Uterine fundus is firm, non-tender and at the level of the umbilicus  Incision C/D/I  Extremities Non-tender    Data   Recent Labs   Lab Test 21   AS Negative     Recent Labs   Lab Test 21  0549 21   HGB 11.9 13.2     No lab results found.

## 2021-07-19 NOTE — OP NOTE
Dania Bhatti MRN# [id number]   Age: 25 year old YOB: 1995         GA: 41w2d  GP:   Labor Complications:    EBL:   mL  Delivery QBL:    Delivery Type: , Low Transverse   ROM to Delivery Time: (Delivered) Minutes: 1       1 Minute 5 Minute 10 Minute   Apgar Totals: 9   9        Personel Present: SAUD FREEMAN       Details     Pre-Op Diagnosis: 1. Intrauterine pregnancy at 41w2d  2. Non-reassuring fetal status   Post-Op Diagnosis: 1. Liveborn female infant   Indications:   Non-reassuring fetal status   Procedure:   , Low Transverse  via   Pfannenstiel incision   Anesthesia: Spinal       Informed Consent:  The risks, benefits, complications, and alternatives were discussed with the patient. The patient understood that the risks of  section include, but are not limited to: injury to nearby structures or organs, infection, blood loss and possible need for transfusion, and potential need for more surgery including hysterectomy. The patient stated understanding and desired to proceed. All questions were answered. The site of surgery was properly noted and marked. The patient was identified as Dania Bhatti and the procedure verified as a  delivery. A Time Out was held and the above information confirmed.     Procedure Details:  DESCRIPTION OF PROCEDURE:     The patient was transferred to the OR where spinal anesthesia was accomplished.  A lawrence catheter was inserted and clear urine was noted.  The abdomen was scrubbed prepped and draped inthe usual manner.  A hard stop timeout was accomplished.  A transverse Pfannenstiel incision was made and sharp and electrocautery dissection carried down to the fascial layer.  The Fascia was opened in the midline andextended bluntly bilaterally.  The rectus muscles were  in the midline bluntly.  The peritoneum was bluntly divided and the Galina ring retractor was placed.  A bladder flap was made  sharply.  The lower uterinesegment was incised sharply in a low transverse fashion and the amniotic sac ruptured bluntly with a finger.  Clear fluid was noted and the incision extended bluntly.  The fetal head was deliverd in the OT position.  The infant received nasal and oropharyngeal suction with the bulb suction. Nuchal cord x1 was noted and reduced as well as a cord around the arm. The shoulders delivered atraumatically followed by the remainder of the baby.   The cord was clamped and cut and the infant handed to the  nurse.   evaluation was accomplished by Dr. Lainez at my request due to non-reassuring fetal status. The placenta was manually removed and the uterus wiped clear of clots and debris. Pitocin was added to the IV infusion and the uterus was noted to firm-up nicely. The tubes and ovaries appeared normal. The uterine incision was closed in a double layer of #1 Chromic in a running fashion. Hemostasis was adequate.  The abdomenirrigated clear of clots and debris.  The peritoneum and the rectus  muscles were approximated with  3 0 vicryl.   The fascia was closed with 0 PDS.  The subcutaneous layer was irrigated and made hemastatic withelectrocautery.  It was closed in a single layer of 3 0 vicryl. The skin was closed with Insorb staples.  The wound was dressed with steri-strips and a pressure dressing.  Counts were correct times 2.  The patient and  were transferred to the recovery room in stable condition.              Labor Events       Olimpia BhattiDmitriyDania [[id number]]        Labor Length    3rd Stage (hrs): 0 (min): 1       Labor Events     labor?: No      Antibiotics received during labor?: No           Rupture date/time: 21   Rupture type: Artificial Rupture of Membranes  Fluid color: Clear  Fluid odor: Normal              Delivery/Placenta Date and Time    Delivery Date: 21 Delivery Time:  9:29 PM   Placenta Date/Time: 2021  9:30 PM  Oxytocin  "given at the time of delivery: after delivery of baby  Delivering clinician: Oh Hong MD          Other personnel present at delivery:  Provider Role   Lina Alba MD Assigned Pediatric Specialist Provider           Apgars    Living status: Living    1 Minute 5 Minute 10 Minute 15 Minute 20 Minute   Skin color: 1  1          Heart rate: 2  2          Reflex irritability: 2  2          Muscle tone: 2  2          Respiratory effort: 2  2          Total: 9  9          Apgars assigned by: SHANITA RICE RN              Cord    Vessels: 3 Vessels     Cord Complications: Nuchal   Nuchal Intervention: reduced         Nuchal cord description: tight nuchal cord         Cord Blood Disposition: Lab     Gases Sent?: No     Delayed cord clamping?: Yes     Stem cell collection?: No          Resuscitation    Methods: Suctioning  [place] Care at Delivery: Primary C/Section for fetal distress. Babe had lusty cry, to warmer for first assessment. ID band #[id number] applied to Babe and parents. Mom and Dad very happy with new baby.                   [place] Measurements            Length: 1' 9\"   Head circumference: 34.9 cm Chest circumference: 36.8 cm                 Skin to Skin and Feeding Plan    Skin to skin initiation date/time: 1841     Skin to skin with: Mother           Skin to skin end date/time:                         Delivery (Maternal) (Provider to Complete) ([id number])                      Blood Loss  Mother: Dania Bhatti #[id number]          Start of Mother's Information           Delivery Blood Loss  214 - 21 2219            Total Surgical QBL Blood Loss (mL) Hospital Encounter 458 mL     Total   458 mL                          End of Mother's Information  Mother: Dania Bhatti #[id number]                     Delivery - Provider to Complete ([id number])    Delivering clinician: Oh Hong MD  Delivery Type (Choose the 1 that will go to the Birth History): , Low " Transverse                        Priority: ASAP    Specifics: Primary nulliparius   Indications for Primary: Fetal Status   Other personnel:  Provider Role   Lina Alba MD Assigned Pediatric Specialist Provider                  Placenta    Date/Time: 2021  9:30 PM  Removal: Manual Removal  Comments: tight nuchal cord and around the arm  Disposition: Hospital disposal              Anesthesia    Method: Spinal                             Presentation and Position    Presentation: Vertex     Position: Middle Occiput Transverse                        Oh Hong MD

## 2021-07-19 NOTE — OR NURSING
PACU Transfer Note    Dania Bhatti was transferred to [facility] via bed.  Equipment used for transport:  none.  Accompanied by:  Yamilka LO  Prescriptions were: none    PACU Respiratory Event Documentation     1) Episodes of Apnea greater than or equal to 10 seconds: 0    2) Bradypnea - less than 8 breaths per minute: 0    3) Pain score on 0 to 10 scale: 0    4) Pain-sedation mismatch (yes or no): no    5) Repeated 02 desaturation less than 90% (yes or no): no    Anesthesia notified? (yes or no): no    Any of the above events occuring repeatedly in separate 30 minute intervals may be considered recurrent PACU respiratory events.    Patient stable and meets phase 1 discharge criteria for transport from PACU.  Report given to Shari LO  400 ml's of Pitocin given

## 2021-07-19 NOTE — ANESTHESIA PREPROCEDURE EVALUATION
Anesthesia Pre-Procedure Evaluation    Patient: Dania Bhatti   MRN: [id number] : 1995        Preoperative Diagnosis: At risk for alteration in fetal status [Z91.89]   Procedure : Procedure(s):   SECTION     No past medical history on file.   No past surgical history on file.   No Known Allergies   Social History     Tobacco Use     Smoking status: Never Smoker     Smokeless tobacco: Never Used   Substance Use Topics     Alcohol use: Not Currently      Wt Readings from Last 1 Encounters:   21 84.6 kg (186 lb 6.4 oz)        Anesthesia Evaluation   Pt has not had prior anesthetic         ROS/MED HX  ENT/Pulmonary:  - neg pulmonary ROS     Neurologic:  - neg neurologic ROS     Cardiovascular:  - neg cardiovascular ROS     METS/Exercise Tolerance: >4 METS    Hematologic:  - neg hematologic  ROS     Musculoskeletal:  - neg musculoskeletal ROS     GI/Hepatic:     (+) GERD, Asymptomatic on medication,     Renal/Genitourinary:  - neg Renal ROS     Endo:  - neg endo ROS     Psychiatric/Substance Use:  - neg psychiatric ROS     Infectious Disease:  - neg infectious disease ROS     Malignancy:  - neg malignancy ROS     Other:      (+) Possibly pregnant, ,         Physical Exam    Airway  airway exam normal      Mallampati: II   TM distance: > 3 FB   Neck ROM: full   Mouth opening: < 3 cm    Respiratory Devices and Support         Dental  no notable dental history         Cardiovascular   cardiovascular exam normal          Pulmonary   pulmonary exam normal                OUTSIDE LABS:  CBC:   Lab Results   Component Value Date    WBC 11.5 (H) 2021    WBC 9.6 03/10/2019    HGB 13.2 2021    HGB 12.6 03/10/2019    HCT 37.9 2021    HCT 37.6 03/10/2019     2021     03/10/2019     BMP:   Lab Results   Component Value Date     (L) 03/10/2019    POTASSIUM 3.9 03/10/2019    CHLORIDE 101 03/10/2019    CO2 26 03/10/2019    BUN 9 03/10/2019    CR 0.62 03/10/2019      03/10/2019     COAGS: No results found for: PTT, INR, FIBR  POC: No results found for: BGM, HCG, HCGS  HEPATIC:   Lab Results   Component Value Date    ALBUMIN 4.5 03/10/2019    PROTTOTAL 7.5 03/10/2019    ALT 12 03/10/2019    AST 11 (L) 03/10/2019    ALKPHOS 54 03/10/2019    BILITOTAL 0.4 03/10/2019     OTHER:   Lab Results   Component Value Date    AKIN 10.0 03/10/2019    LIPASE 5 (L) 03/10/2019       Anesthesia Plan    ASA Status:  2   NPO Status:  NPO Appropriate    Anesthesia Type: Epidural.   Induction: Intravenous.   Maintenance: Balanced.        Consents    Anesthesia Plan(s) and associated risks, benefits, and realistic alternatives discussed. Questions answered and patient/representative(s) expressed understanding.     - Discussed with:  Patient      - Extended Intubation/Ventilatory Support Discussed: No.      - Patient is DNR/DNI Status: No    Use of blood products discussed: No .     Postoperative Care    Pain management: Multi-modal analgesia, intrathecal morphine, Peripheral nerve block (Single Shot).   PONV prophylaxis: Ondansetron (or other 5HT-3)     Comments:    Risks, benefits and alternatives discussed and would like to proceed with TAP block for post operative pain control and spinal with ITN for procedure. General anesthesia ok if indicated.               NINI De La Paz CRNA

## 2021-07-19 NOTE — ANESTHESIA CARE TRANSFER NOTE
Patient: Dania Bhatti    Procedure(s):   SECTION    Diagnosis: At risk for alteration in fetal status [Z91.89]  Diagnosis Additional Information: No value filed.    Anesthesia Type:   Epidural     Note:    Oropharynx: oropharynx clear of all foreign objects  Level of Consciousness: awake  Oxygen Supplementation: room air    Independent Airway: airway patency satisfactory and stable  Dentition: dentition unchanged  Vital Signs Stable: post-procedure vital signs reviewed and stable  Report to RN Given: handoff report given  Patient transferred to: Labor and Delivery    Handoff Report: Identifed the Patient, Identified the Reponsible Provider, Reviewed the pertinent medical history, Discussed the surgical course, Reviewed Intra-OP anesthesia mangement and issues during anesthesia, Set expectations for post-procedure period and Allowed opportunity for questions and acknowledgement of understanding      Vitals: (Last set prior to Anesthesia Care Transfer)  CRNA VITALS  20215 - 2021 2225      2021             Resp Rate (set):  10        Electronically Signed By: NINI De La Paz CRNA  2021  10:25 PM

## 2021-07-19 NOTE — PROGRESS NOTES
Patient had lawrence catheter removed at 0600 this morning and was able to void spontaneously at 1030 for a total of 500 ml and then again at 1330 for an unmeasured total.

## 2021-07-19 NOTE — PROGRESS NOTES
"Lactated Ringers bolus infusing per Dr. Hong\"s order. He will be coming in to evaluate the patients Category 2 strip. Will continue to closely monitor.   "

## 2021-07-19 NOTE — H&P
[facility]    History and Physical  Obstetrics and Gynecology     Date of Admission:  2021    Assessment & Plan   Dania Bhatti is a 25 year old female who presents with induction of labor at 41w2d  ASSESSMENT:   IUP @ 41w2d for induction of labor.  Indication postterm.  NST Cat 2    PLAN:   Admit - see IP orders    Oh Hong    History of Present Illness   Dania Bhatti is a 25 year old female  41w2d  Estimated Date of Delivery: 2021 is calculated from Patient's last menstrual period was 10/02/2020 (exact date). is [facility] for induction, but on admission the fetal tracing showed baseline tachycardia with frequent decelerations. Will proceed with  section for non-reassuring fetal status.    PRENATAL COURSE  Prenatal course was essentially uncomplicated      Recent Labs   Lab Test 21  1907   AS Negative     Rhogam not indicated   No lab results found.    Past Medical History    I have reviewed this patient's medical history and updated it with pertinent information if needed.   No past medical history on file.    Past Surgical History   I have reviewed this patient's surgical history and updated it with pertinent information if needed.  No past surgical history on file.    Prior to Admission Medications   Prior to Admission Medications   Prescriptions Last Dose Informant Patient Reported? Taking?   Prenatal Vit-Fe Fumarate-FA (PRENATAL MULTIVITAMIN  PLUS IRON) 27-1 MG TABS 2021 at Unknown time  Yes Yes   Sig: Take 1 tablet by mouth daily      Facility-Administered Medications: None     Allergies   No Known Allergies    Social History   I have reviewed this patient's social history and updated it with pertinent information if needed. Dania Bhatti  reports that she has never smoked. She has never used smokeless tobacco. She reports previous alcohol use. She reports that she does not use drugs.    Family History   I have reviewed this  patient's family history and updated it with pertinent information if needed.   No family history on file.    Immunization History   Immunizations are up to date    Physical Exam   Temp: 98.1  F (36.7  C) Temp src: Temporal BP: 135/74 Pulse: 98   Resp: 16 SpO2: 98 % O2 Device: None (Room air)    Vital Signs with Ranges  Temp:  [97.5  F (36.4  C)-98.1  F (36.7  C)] 98.1  F (36.7  C)  Pulse:  [] 98  Resp:  [16-18] 16  BP: (124-139)/(74-84) 135/74  SpO2:  [95 %-99 %] 98 %    Abdomen: gravid, single vertex fetus, non-tender, EFW 8 lbs 0oz    Fetal Heart Tones: 180 baseline, moderate variablility, + accels, early decelerations and Category II  TOCO:  Irregular contractions    Constitutional: healthy, alert, active and no distress   Respiratory: No increased work of breathing, good air exchange, clear to auscultation bilaterally, no crackles or wheezing  Cardiovascular: Normal apical impulse, regular rate and rhythm, normal S1 and S2, no S3 or S4, and no murmur noted  Skin/Extremites: no rashes and no lesions  Neurologic: A&O x 3

## 2021-07-19 NOTE — L&D DELIVERY NOTE
OB  Delivery Note      Dania Bhatti MRN# [id number]   Age: 25 year old YOB: 1995       GA: 41w2d  GP:   Labor Complications:    EBL:   mL  Delivery QBL:    Delivery Type: , Low Transverse   ROM to Delivery Time: (Delivered) Minutes: 1     1 Minute 5 Minute 10 Minute   Apgar Totals: 9   9        Personel Present: SAUD FREEMAN      Details    Pre-Op Diagnosis: 1. Intrauterine pregnancy at 41w2d  2. Non-reassuring fetal status   Post-Op Diagnosis: 1. Liveborn female infant   Indications:   Non-reassuring fetal status   Procedure:   , Low Transverse  via   Pfannenstiel incision   Anesthesia: Spinal      Informed Consent:  The risks, benefits, complications, and alternatives were discussed with the patient. The patient understood that the risks of  section include, but are not limited to: injury to nearby structures or organs, infection, blood loss and possible need for transfusion, and potential need for more surgery including hysterectomy. The patient stated understanding and desired to proceed. All questions were answered. The site of surgery was properly noted and marked. The patient was identified as Dania Bhatti and the procedure verified as a  delivery. A Time Out was held and the above information confirmed.    Procedure Details:  DESCRIPTION OF PROCEDURE:     The patient was transferred to the OR where spinal anesthesia was accomplished.  A lawrence catheter was inserted and clear urine was noted.  The abdomen was scrubbed prepped and draped inthe usual manner.  A hard stop timeout was accomplished.  A transverse Pfannenstiel incision was made and sharp and electrocautery dissection carried down to the fascial layer.  The Fascia was opened in the midline andextended bluntly bilaterally.  The rectus muscles were  in the midline bluntly.  The peritoneum was bluntly divided and the Galina ring retractor was placed.   A bladder flap was made sharply.  The lower uterinesegment was incised sharply in a low transverse fashion and the amniotic sac ruptured bluntly with a finger.  Clear fluid was noted and the incision extended bluntly.  The fetal head was deliverd in the OT position.  The infant received nasal and oropharyngeal suction with the bulb suction. Nuchal cord x1 was noted and reduced as well as a cord around the arm. The shoulders delivered atraumatically followed by the remainder of the baby.   The cord was clamped and cut and the infant handed to the  nurse.   evaluation was accomplished by Dr. Lainez at my request due to non-reassuring fetal status. The placenta was manually removed and the uterus wiped clear of clots and debris. Pitocin was added to the IV infusion and the uterus was noted to firm-up nicely. The tubes and ovaries appeared normal. The uterine incision was closed in a double layer of #1 Chromic in a running fashion. Hemostasis was adequate.  The abdomenirrigated clear of clots and debris.  The peritoneum and the rectus  muscles were approximated with  3 0 vicryl.   The fascia was closed with 0 PDS.  The subcutaneous layer was irrigated and made hemastatic withelectrocautery.  It was closed in a single layer of 3 0 vicryl. The skin was closed with Insorb staples.  The wound was dressed with steri-strips and a pressure dressing.  Counts were correct times 2.  The patient andnewborn were transferred to the recovery room in stable condition.       Olimpia Bhatti-Dania [[id number]]    Labor Length    3rd Stage (hrs): 0 (min): 1      Labor Events     labor?: No     Antibiotics received during labor?: No     Rupture date/time: 21   Rupture type: Artificial Rupture of Membranes  Fluid color: Clear  Fluid odor: Normal     Induction date/time:     Cervical ripening date/time:     Indications for induction: Post-term Gestation     Delivery/Placenta Date and Time    Delivery Date:  "21 Delivery Time:  9:29 PM   Placenta Date/Time: 2021  9:30 PM  Oxytocin given at the time of delivery: after delivery of baby  Delivering clinician: Oh Hong MD   Other personnel present at delivery:  Provider Role   Lina Alba MD Assigned Pediatric Specialist Provider         Apgars    Living status: Living   1 Minute 5 Minute 10 Minute 15 Minute 20 Minute   Skin color: 1  1       Heart rate: 2  2       Reflex irritability: 2  2       Muscle tone: 2  2       Respiratory effort: 2  2       Total: 9  9       Apgars assigned by: SHANITA RICE RN     Cord    Vessels: 3 Vessels    Cord Complications: Nuchal   Nuchal Intervention: reduced         Nuchal cord description: tight nuchal cord         Cord Blood Disposition: Lab    Gases Sent?: No    Delayed cord clamping?: Yes    Stem cell collection?: No       [place] Resuscitation    Methods: Suctioning  [place] Care at Delivery: Primary C/Section for fetal distress. Babe had lusty cry, to warmer for first assessment. ID band #[id number] applied to Babe and parents. Mom and Dad very happy with new baby.       Measurements    Weight: 7 lb 10.7 oz Length: 1' 9\"   Head circumference: 34.9 cm Chest circumference: 36.8 cm      Skin to Skin and Feeding Plan    Skin to skin initiation date/time: 1841    Skin to skin with: Mother  Skin to skin end date/time:        Delivery (Maternal) (Provider to Complete) ([id number])       Blood Loss  Mother: Dania Bhatti #[id number]   Start of Mother's Information    Delivery Blood Loss  21 2124 - 21 0754    Total Surgical QBL Blood Loss (mL) Hospital Encounter 458 mL    Postpartum QBL (mL) Hospital Encounter -103 mL    Total  355 mL         End of Mother's Information  Mother: Dania Bhatti #[id number]          Delivery - Provider to Complete ([id number])    Delivering clinician: Oh Hong MD  Delivery Type (Choose the 1 that will go to the Birth History): , Low Transverse     "                Priority: ASAP    Specifics: Primary nulliparius   Indications for Primary: Fetal Status   Other personnel:  Provider Role   Lina Alba MD Assigned Pediatric Specialist Provider                Placenta    Date/Time: 2021  9:30 PM  Removal: Manual Removal  Comments: tight nuchal cord and around the arm  Disposition: Hospital disposal           Anesthesia    Method: Spinal                Presentation and Position    Presentation: Vertex    Position: Middle Occiput Transverse                 Oh Hong MD

## 2021-07-19 NOTE — LACTATION NOTE
This note was copied from a baby's chart.  INPATIENT LACTATION CONSULT      Consult with Dania and gita regarding breastfeeding.  Obvious rooting with a strong latch observed this feeding session.  Rhythmic and aggressive suckling also noted.  Instructed Dania on correct positioning and technique when latching babe on.  Dania is independent with latching babe onto breast.  Minimal assistance required.  Encouraged Dania on the importance of frequent feedings throughout the day (at least 8-12 feedings in a 24 hour period) and skin to skin contact.  Dania demonstrated and states she understands all information given.    Shari Juarez RN, IBCLC  Lactation Consultant  [facility] and [facility]

## 2021-07-19 NOTE — OR NURSING
Uterine incision at 2128. Viable baby girl born at 2129. Cord blood sent to lab per protocol. Section of cord sent with OB RN

## 2021-07-19 NOTE — PROGRESS NOTES
Stood at the edge of the bed. Tolerated activity well. Barahona removed at this time per Dr. Hong's order. Settled back in bed, offers no complaints at this time.

## 2021-07-19 NOTE — ANESTHESIA PROCEDURE NOTES
Intrathecal injection Procedure Note  Pre-Procedure   Staff -        CRNA: Inocente Hernandez APRN CRNA       Performed By: CRNA       Location: OR       Procedure Start/Stop Times: 2021 9:09 PM and 2021 9:12 PM       Pre-Anesthestic Checklist: patient identified, IV checked, risks and benefits discussed, informed consent, monitors and equipment checked, pre-op evaluation, at physician/surgeon's request and post-op pain management  Timeout:       Correct Patient: Yes        Correct Procedure: Yes        Correct Site: Yes        Correct Position: Yes   Procedure Documentation  Procedure: intrathecal injection       Diagnosis:        Patient Position: sitting       Patient Prep/Sterile Barriers: sterile gloves, mask       Skin prep: Chloraprep       Insertion Site: L3-4. (midline approach).       Needle Gauge: 25.        Needle Length (Inches): 3.5        Spinal Needle Type: Quincke       Introducer used       Introducer: 20 G      # of attempts: 1 and  # of redirects:     Assessment/Narrative         Paresthesias: No.       CSF fluid: clear.      Opening pressure was cmH2O while  Sitting.

## 2021-07-20 VITALS
OXYGEN SATURATION: 96 % | TEMPERATURE: 97 F | RESPIRATION RATE: 16 BRPM | DIASTOLIC BLOOD PRESSURE: 59 MMHG | HEART RATE: 96 BPM | SYSTOLIC BLOOD PRESSURE: 104 MMHG

## 2021-07-20 PROCEDURE — 99207 PR NO CHARGE LOS: CPT | Performed by: OBSTETRICS & GYNECOLOGY

## 2021-07-20 PROCEDURE — 250N000013 HC RX MED GY IP 250 OP 250 PS 637: Performed by: OBSTETRICS & GYNECOLOGY

## 2021-07-20 RX ORDER — AMOXICILLIN 250 MG
1 CAPSULE ORAL 2 TIMES DAILY PRN
Qty: 20 TABLET | Refills: 0 | Status: SHIPPED | OUTPATIENT
Start: 2021-07-20 | End: 2021-08-30

## 2021-07-20 RX ORDER — IBUPROFEN 600 MG/1
600 TABLET, FILM COATED ORAL EVERY 6 HOURS PRN
Qty: 30 TABLET | Refills: 0 | Status: SHIPPED | OUTPATIENT
Start: 2021-07-20 | End: 2021-08-30

## 2021-07-20 RX ORDER — OXYCODONE HYDROCHLORIDE 5 MG/1
5 TABLET ORAL EVERY 4 HOURS PRN
Qty: 12 TABLET | Refills: 0 | Status: SHIPPED | OUTPATIENT
Start: 2021-07-20 | End: 2021-08-30

## 2021-07-20 RX ADMIN — IBUPROFEN 800 MG: 400 TABLET ORAL at 03:08

## 2021-07-20 RX ADMIN — ACETAMINOPHEN 975 MG: 325 TABLET, FILM COATED ORAL at 06:20

## 2021-07-20 RX ADMIN — ACETAMINOPHEN 975 MG: 325 TABLET, FILM COATED ORAL at 12:36

## 2021-07-20 RX ADMIN — IBUPROFEN 800 MG: 400 TABLET ORAL at 15:46

## 2021-07-20 RX ADMIN — ACETAMINOPHEN 975 MG: 325 TABLET, FILM COATED ORAL at 00:02

## 2021-07-20 RX ADMIN — IBUPROFEN 800 MG: 400 TABLET ORAL at 09:07

## 2021-07-20 NOTE — PROGRESS NOTES
[facility]    Obstetrics Post-Op / Progress Note    Assessment & Plan   Assessment:  -2 Days Post-Op  Procedure(s):   SECTION    Doing well.  Clean wound without signs of infection.    Plan:  Ambulation encouraged  Breast feeding strategies discussed  Anticipate discharge in 1-2 days    Oh AOlivia Hal     Interval History   Doing well.  Pain is well-controlled.  No fevers.  No history of wound drainage, warmth or significant erythema.  Good appetite.  Denies chest pain, shortness of breath, nausea or vomiting.  Ambulatory.  Breastfeeding well.    Medications     - MEDICATION INSTRUCTIONS -       - MEDICATION INSTRUCTIONS -       oxytocin in 0.9% NaCl       oxytocin in 0.9% NaCl         acetaminophen  975 mg Oral Q6H     ibuprofen  800 mg Oral Q6H     senna-docusate  1 tablet Oral BID    Or     senna-docusate  2 tablet Oral BID     sodium chloride (PF)  3 mL Intracatheter Q8H       Physical Exam   Temp: 97.5  F (36.4  C) Temp src: Temporal BP: 115/61 Pulse: 78   Resp: 16 SpO2: 96 % O2 Device: None (Room air)    There were no vitals filed for this visit.  Vital Signs with Ranges  Temp:  [96.4  F (35.8  C)-97.5  F (36.4  C)] 97.5  F (36.4  C)  Pulse:  [78] 78  Resp:  [16] 16  BP: (115-116)/(59-61) 115/61  SpO2:  [96 %] 96 %  I/O last 3 completed shifts:  In: -   Out: 500 [Urine:500]    Uterine fundus is firm, non-tender and at the level of the umbilicus  Incision C/D/I  Extremities Non-tender    Data   Recent Labs   Lab Test 21   AS Negative     Recent Labs   Lab Test 21  0549 21   HGB 11.9 13.2     No lab results found.

## 2021-07-20 NOTE — DISCHARGE SUMMARY
[facility]    Discharge Summary  Obstetrics    Date of Admission:  2021  Date of Discharge:  2021  Discharging Provider: Oh Hong    Discharge Diagnoses   At risk for alteration in fetal status [Z91.89]    Patient Active Problem List   Diagnosis     Encounter for triage in pregnant patient     Normal labor     At risk for alteration in fetal status       Procedure/Surgery Information   Procedure: Procedure(s):   SECTION   Surgeon(s): Surgeon(s) and Role:     * Oh Hong MD - Primary     History of Present Illness   Dania Bhatti is a 25 year old female who presented with non-reassuring tracing and had an emergent  section. Her surgery was uncomplicated. Baby did well. She had a pos asymptomatic COVID-19 screen.    Hospital Course   The patient's hospital course was unremarkable.  She recovered as anticipated and experienced no post-operative complications.  On discharge, her pain was well controlled. Vaginal bleeding is similar to peak menstrual flow.  Voiding without difficulty.  Ambulating well and tolerating a normal diet.  No fever or significant wound drainage.  Breastfeeding well.  Infant is stable.  She was discharged on post-partum day #3.    Post-partum hemoglobin:   Hemoglobin   Date Value Ref Range Status   2021 11.9 11.7 - 15.7 g/dL Final   03/10/2019 12.6 11.7 - 15.7 g/dL Final       Oh Hong MD    Discharge Disposition   Discharged to home   Condition at discharge: Stable    Pending Results   Final pathology results: No pathology submitted    Unresulted Labs Ordered in the Past 30 Days of this Admission     No orders found from 2021 to 2021.          Primary Care Physician   Physician No Ref-Primary    Consultations This Hospital Stay   HOME CARE POST PARTUM/ IP CONSULT  LACTATION IP CONSULT    Discharge Orders      Activity    Activity as tolerated     Reason for your hospital stay    Maternity care     Follow  Up    Follow up with provider in 2 weeks and 6 weeks for post-delivery checks     Breast pump    Breast Pump Documentation:  Manual/Electric Pump: To support adequate breast milk production and nutrition for infant.     I, the undersigned, certify that the above prescribed supplies are medically necessary for this patient and is both reasonable and necessary in reference to accepted standards of medical and necessary in reference to accepted standards of medical practice in the treatment of this patient's condition and is not prescribed as a convenience.     Diet    Resume previous diet     Discharge Medications   Current Discharge Medication List      START taking these medications    Details   ibuprofen (ADVIL/MOTRIN) 600 MG tablet Take 1 tablet (600 mg) by mouth every 6 hours as needed for moderate pain  Qty: 30 tablet, Refills: 0    Associated Diagnoses: S/P  section      oxyCODONE (ROXICODONE) 5 MG tablet Take 1 tablet (5 mg) by mouth every 4 hours as needed for moderate to severe pain  Qty: 12 tablet, Refills: 0    Associated Diagnoses: S/P  section      senna-docusate (SENOKOT-S/PERICOLACE) 8.6-50 MG tablet Take 1 tablet by mouth 2 times daily as needed for constipation  Qty: 20 tablet, Refills: 0    Associated Diagnoses: S/P  section         CONTINUE these medications which have NOT CHANGED    Details   Prenatal Vit-Fe Fumarate-FA (PRENATAL MULTIVITAMIN  PLUS IRON) 27-1 MG TABS Take 1 tablet by mouth daily           Allergies   No Known Allergies

## 2021-07-20 NOTE — PROGRESS NOTES
Assessment done, VSS. See flowsheet for further info. Patient is ambulating, eating a regular diet, voiding W/O difficulty and her pain is being controled with PO medications. Will continue to monitor.

## 2021-07-20 NOTE — PLAN OF CARE
/61   Pulse 78   Temp 97.5  F (36.4  C) (Temporal)   Resp 16   LMP 10/02/2020 (Exact Date)   SpO2 96%   Breastfeeding Unknown     Patient independent in the room. C/o pain at incisional site, Ice pack provided and medications given. Dressing remains in place, shadowing remains inside lines. Breast feeding independently. Denies further needs at this time.

## 2021-07-20 NOTE — PROGRESS NOTES
NS[facility] DISCHARGE NOTE    Patient discharged to home at 4:25 PM via ambulation. Accompanied by spouse and staff. Discharge instructions reviewed with patient, opportunity offered to ask questions. Prescriptions sent to patients preferred pharmacy. All belongings sent with patient.    Rosey Foley RN

## 2021-07-21 ENCOUNTER — APPOINTMENT (OUTPATIENT)
Dept: GENERAL RADIOLOGY | Facility: OTHER | Age: 26
End: 2021-07-21
Attending: PHYSICIAN ASSISTANT
Payer: COMMERCIAL

## 2021-07-21 ENCOUNTER — APPOINTMENT (OUTPATIENT)
Dept: ULTRASOUND IMAGING | Facility: OTHER | Age: 26
End: 2021-07-21
Attending: PHYSICIAN ASSISTANT
Payer: COMMERCIAL

## 2021-07-21 ENCOUNTER — HOSPITAL ENCOUNTER (EMERGENCY)
Facility: OTHER | Age: 26
Discharge: HOME | End: 2021-07-21
Attending: PHYSICIAN ASSISTANT | Admitting: PHYSICIAN ASSISTANT
Payer: COMMERCIAL

## 2021-07-21 VITALS
OXYGEN SATURATION: 98 % | BODY MASS INDEX: 30.05 KG/M2 | WEIGHT: 176 LBS | RESPIRATION RATE: 18 BRPM | HEART RATE: 88 BPM | HEIGHT: 64 IN | SYSTOLIC BLOOD PRESSURE: 136 MMHG | DIASTOLIC BLOOD PRESSURE: 77 MMHG | TEMPERATURE: 98 F

## 2021-07-21 DIAGNOSIS — R07.9 CHEST PAIN: ICD-10-CM

## 2021-07-21 LAB
ALBUMIN SERPL-MCNC: 3.3 G/DL (ref 3.5–5.7)
ALBUMIN UR-MCNC: 20 MG/DL
ALP SERPL-CCNC: 94 U/L (ref 34–104)
ALT SERPL W P-5'-P-CCNC: 18 U/L (ref 7–52)
ANION GAP SERPL CALCULATED.3IONS-SCNC: 8 MMOL/L (ref 3–14)
APPEARANCE UR: CLEAR
AST SERPL W P-5'-P-CCNC: 22 U/L (ref 13–39)
BACTERIA #/AREA URNS HPF: ABNORMAL /HPF
BASOPHILS # BLD AUTO: 0 10E3/UL (ref 0–0.2)
BASOPHILS NFR BLD AUTO: 0 %
BILIRUB SERPL-MCNC: 0.3 MG/DL (ref 0.3–1)
BILIRUB UR QL STRIP: NEGATIVE
BUN SERPL-MCNC: 8 MG/DL (ref 7–25)
CALCIUM SERPL-MCNC: 9.5 MG/DL (ref 8.6–10.3)
CAOX CRY #/AREA URNS HPF: ABNORMAL /HPF
CHLORIDE BLD-SCNC: 105 MMOL/L (ref 98–107)
CO2 SERPL-SCNC: 25 MMOL/L (ref 21–31)
COLOR UR AUTO: ABNORMAL
CREAT SERPL-MCNC: 0.57 MG/DL (ref 0.6–1.2)
EOSINOPHIL # BLD AUTO: 0.2 10E3/UL (ref 0–0.7)
EOSINOPHIL NFR BLD AUTO: 2 %
ERYTHROCYTE [DISTWIDTH] IN BLOOD BY AUTOMATED COUNT: 12.8 % (ref 10–15)
GFR SERPL CREATININE-BSD FRML MDRD: >90 ML/MIN/1.73M2
GLUCOSE BLD-MCNC: 79 MG/DL (ref 70–105)
GLUCOSE UR STRIP-MCNC: NEGATIVE MG/DL
HCT VFR BLD AUTO: 32.6 % (ref 35–47)
HGB BLD-MCNC: 10.9 G/DL (ref 11.7–15.7)
HGB UR QL STRIP: ABNORMAL
IMM GRANULOCYTES # BLD: 0.1 10E3/UL
IMM GRANULOCYTES NFR BLD: 1 %
KETONES UR STRIP-MCNC: NEGATIVE MG/DL
LACTATE SERPL-SCNC: 0.5 MMOL/L (ref 0.7–2)
LEUKOCYTE ESTERASE UR QL STRIP: NEGATIVE
LIPASE SERPL-CCNC: 11 U/L (ref 11–82)
LYMPHOCYTES # BLD AUTO: 2.8 10E3/UL (ref 0.8–5.3)
LYMPHOCYTES NFR BLD AUTO: 22 %
MCH RBC QN AUTO: 29.7 PG (ref 26.5–33)
MCHC RBC AUTO-ENTMCNC: 33.4 G/DL (ref 31.5–36.5)
MCV RBC AUTO: 89 FL (ref 78–100)
MONOCYTES # BLD AUTO: 1.1 10E3/UL (ref 0–1.3)
MONOCYTES NFR BLD AUTO: 8 %
MUCOUS THREADS #/AREA URNS LPF: PRESENT /LPF
NEUTROPHILS # BLD AUTO: 8.7 10E3/UL (ref 1.6–8.3)
NEUTROPHILS NFR BLD AUTO: 67 %
NITRATE UR QL: NEGATIVE
NRBC # BLD AUTO: 0 10E3/UL
NRBC BLD AUTO-RTO: 0 /100
PH UR STRIP: 7 [PH] (ref 5–9)
PLATELET # BLD AUTO: 206 10E3/UL (ref 150–450)
POTASSIUM BLD-SCNC: 3.7 MMOL/L (ref 3.5–5.1)
PROT SERPL-MCNC: 6.3 G/DL (ref 6.4–8.9)
RBC # BLD AUTO: 3.67 10E6/UL (ref 3.8–5.2)
RBC URINE: >182 /HPF
SODIUM SERPL-SCNC: 138 MMOL/L (ref 134–144)
SP GR UR STRIP: 1.01 (ref 1–1.03)
SQUAMOUS EPITHELIAL: 1 /HPF
TROPONIN I SERPL-MCNC: 2.9 PG/ML (ref 0–34)
UROBILINOGEN UR STRIP-MCNC: NORMAL MG/DL
WBC # BLD AUTO: 12.8 10E3/UL (ref 4–11)
WBC URINE: 3 /HPF

## 2021-07-21 PROCEDURE — 82040 ASSAY OF SERUM ALBUMIN: CPT | Performed by: PHYSICIAN ASSISTANT

## 2021-07-21 PROCEDURE — 36415 COLL VENOUS BLD VENIPUNCTURE: CPT | Performed by: PHYSICIAN ASSISTANT

## 2021-07-21 PROCEDURE — 83690 ASSAY OF LIPASE: CPT | Performed by: PHYSICIAN ASSISTANT

## 2021-07-21 PROCEDURE — 93005 ELECTROCARDIOGRAM TRACING: CPT | Performed by: PHYSICIAN ASSISTANT

## 2021-07-21 PROCEDURE — 99285 EMERGENCY DEPT VISIT HI MDM: CPT | Mod: 25 | Performed by: PHYSICIAN ASSISTANT

## 2021-07-21 PROCEDURE — 93010 ELECTROCARDIOGRAM REPORT: CPT | Performed by: INTERNAL MEDICINE

## 2021-07-21 PROCEDURE — 93970 EXTREMITY STUDY: CPT

## 2021-07-21 PROCEDURE — 84484 ASSAY OF TROPONIN QUANT: CPT | Performed by: PHYSICIAN ASSISTANT

## 2021-07-21 PROCEDURE — 71045 X-RAY EXAM CHEST 1 VIEW: CPT

## 2021-07-21 PROCEDURE — 82374 ASSAY BLOOD CARBON DIOXIDE: CPT | Performed by: PHYSICIAN ASSISTANT

## 2021-07-21 PROCEDURE — 83605 ASSAY OF LACTIC ACID: CPT | Performed by: PHYSICIAN ASSISTANT

## 2021-07-21 PROCEDURE — 81001 URINALYSIS AUTO W/SCOPE: CPT | Performed by: PHYSICIAN ASSISTANT

## 2021-07-21 PROCEDURE — 85025 COMPLETE CBC W/AUTO DIFF WBC: CPT | Performed by: PHYSICIAN ASSISTANT

## 2021-07-21 PROCEDURE — 99283 EMERGENCY DEPT VISIT LOW MDM: CPT | Performed by: PHYSICIAN ASSISTANT

## 2021-07-21 ASSESSMENT — MIFFLIN-ST. JEOR: SCORE: 1538.33

## 2021-07-21 NOTE — ED TRIAGE NOTES
ED Nursing Triage Note (General)   ________________________________    Dania OSCAR Bhatti is a 25 year old Female that presents to triage private car  With history of  Post partum with csection POD 3, chest painwith dizziness reported by patient   Significant symptoms had onset 1 day(s) ago.  LMP 10/02/2020 (Exact Date) t recent pregnancy  Patient appears alert , in no acute distress., and cooperative behavior.    GCS Total = 15  Airway: intact  Breathing noted as Normal  Circulation Normal  Skin:  Normal  Action taken:  Triage to critical care immediately      PRE HOSPITAL PRIOR LIVING SITUATION Spouse and Children

## 2021-07-21 NOTE — ED PROVIDER NOTES
History     Chief Complaint   Patient presents with     Chest Pain     HPI  Dania Bhatti is a 25 year old female who presents to the ED for evaluation of chest pain and shortness of breath.  She is status post  x3 days,  was performed due to alteration of fetal status.  She reports last night having a brief episode of some chest pain and shortness of breath.  It occurred again earlier today.  She has no past history of blood clots.  She denies any fevers or cough, abdominal pain, dysuria.  She has no past cardiac history, no history of hypertension, hyperlipidemia, she doesn't smoke.    Allergies:  No Known Allergies    Problem List:    Patient Active Problem List    Diagnosis Date Noted     Normal labor 2021     Priority: Medium     Encounter for triage in pregnant patient 2021     Priority: Medium     At risk for alteration in fetal status 2021     Priority: Medium     Added automatically from request for surgery [id number]          Past Medical History:    No past medical history on file.    Past Surgical History:    Past Surgical History:   Procedure Laterality Date      SECTION N/A 2021    Procedure:  SECTION;  Surgeon: Oh Hong MD;  Location:  OR       Family History:    No family history on file.    Social History:  Marital Status:   [2]  Social History     Tobacco Use     Smoking status: Never Smoker     Smokeless tobacco: Never Used   Vaping Use     Vaping Use: Never used   Substance Use Topics     Alcohol use: Not Currently     Drug use: Never        Medications:    ibuprofen (ADVIL/MOTRIN) 600 MG tablet  Prenatal Vit-Fe Fumarate-FA (PRENATAL MULTIVITAMIN  PLUS IRON) 27-1 MG TABS  oxyCODONE (ROXICODONE) 5 MG tablet  senna-docusate (SENOKOT-S/PERICOLACE) 8.6-50 MG tablet          Review of Systems   Constitutional: Negative for chills and fever.   HENT: Negative for congestion.    Eyes: Negative for visual disturbance.   Respiratory:  "Positive for shortness of breath. Negative for chest tightness.    Cardiovascular: Positive for chest pain.   Gastrointestinal: Negative for abdominal pain.   Genitourinary: Negative for hematuria.   Musculoskeletal: Negative for back pain.   Skin: Negative for rash and wound.   Neurological: Negative for syncope.   Hematological: Negative for adenopathy. Does not bruise/bleed easily.   Psychiatric/Behavioral: Negative for confusion.       Physical Exam   BP: (!) 144/88  Pulse: 75  Temp: 98  F (36.7  C)  Resp: 18  Height: 162.6 cm (5' 4\")  Weight: 79.8 kg (176 lb)  SpO2: 98 %      Physical Exam  Constitutional:       General: She is not in acute distress.     Appearance: She is well-developed. She is not diaphoretic.   HENT:      Head: Normocephalic and atraumatic.   Eyes:      General: No scleral icterus.     Conjunctiva/sclera: Conjunctivae normal.   Cardiovascular:      Rate and Rhythm: Normal rate and regular rhythm.   Pulmonary:      Effort: Pulmonary effort is normal.      Breath sounds: Normal breath sounds.   Abdominal:      Palpations: Abdomen is soft.      Tenderness: There is no abdominal tenderness.   Musculoskeletal:         General: No deformity.      Cervical back: Neck supple.   Lymphadenopathy:      Cervical: No cervical adenopathy.   Skin:     General: Skin is warm and dry.      Findings: No rash.   Neurological:      Mental Status: She is alert and oriented to person, place, and time. Mental status is at baseline.   Psychiatric:         Mood and Affect: Mood normal.         Behavior: Behavior normal.         ED Course        Procedures         EKG read at 1734. HR 74, NSR, no ST changes.    Critical Care time:  none               Results for orders placed or performed during the hospital encounter of 07/21/21 (from the past 24 hour(s))   CBC with platelets differential    Narrative    The following orders were created for panel order CBC with platelets differential.  Procedure                      "          Abnormality         Status                     ---------                               -----------         ------                     CBC with platelets and d...[[id number]]  Abnormal            Final result                 Please view results for these tests on the individual orders.   Comprehensive metabolic panel   Result Value Ref Range    Sodium 138 134 - 144 mmol/L    Potassium 3.7 3.5 - 5.1 mmol/L    Chloride 105 98 - 107 mmol/L    Carbon Dioxide (CO2) 25 21 - 31 mmol/L    Anion Gap 8 3 - 14 mmol/L    Urea Nitrogen 8 7 - 25 mg/dL    Creatinine 0.57 (L) 0.60 - 1.20 mg/dL    Calcium 9.5 8.6 - 10.3 mg/dL    Glucose 79 70 - 105 mg/dL    Alkaline Phosphatase 94 34 - 104 U/L    AST 22 13 - 39 U/L    ALT 18 7 - 52 U/L    Protein Total 6.3 (L) 6.4 - 8.9 g/dL    Albumin 3.3 (L) 3.5 - 5.7 g/dL    Bilirubin Total 0.3 0.3 - 1.0 mg/dL    GFR Estimate >90 >60 mL/min/1.73m2   Lipase   Result Value Ref Range    Lipase 11 11 - 82 U/L   Lactic acid whole blood   Result Value Ref Range    Lactic Acid 0.5 (L) 0.7 - 2.0 mmol/L   Troponin I   Result Value Ref Range    Troponin I 2.9 0.0 - 34.0 pg/mL   CBC with platelets and differential   Result Value Ref Range    WBC Count 12.8 (H) 4.0 - 11.0 10e3/uL    RBC Count 3.67 (L) 3.80 - 5.20 10e6/uL    Hemoglobin 10.9 (L) 11.7 - 15.7 g/dL    Hematocrit 32.6 (L) 35.0 - 47.0 %    MCV 89 78 - 100 fL    MCH 29.7 26.5 - 33.0 pg    MCHC 33.4 31.5 - 36.5 g/dL    RDW 12.8 10.0 - 15.0 %    Platelet Count 206 150 - 450 10e3/uL    % Neutrophils 67 %    % Lymphocytes 22 %    % Monocytes 8 %    % Eosinophils 2 %    % Basophils 0 %    % Immature Granulocytes 1 %    NRBCs per 100 WBC 0 <1 /100    Absolute Neutrophils 8.7 (H) 1.6 - 8.3 10e3/uL    Absolute Lymphocytes 2.8 0.8 - 5.3 10e3/uL    Absolute Monocytes 1.1 0.0 - 1.3 10e3/uL    Absolute Eosinophils 0.2 0.0 - 0.7 10e3/uL    Absolute Basophils 0.0 0.0 - 0.2 10e3/uL    Absolute Immature Granulocytes 0.1 (H) <=0.0 10e3/uL    Absolute NRBCs  0.0 10e3/uL   UA reflex to Microscopic   Result Value Ref Range    Color Urine Light Yellow Colorless, Straw, Light Yellow, Yellow    Appearance Urine Clear Clear    Glucose Urine Negative Negative mg/dL    Bilirubin Urine Negative Negative    Ketones Urine Negative Negative mg/dL    Specific Gravity Urine 1.013 1.000 - 1.030    Blood Urine Large (A) Negative    pH Urine 7.0 5.0 - 9.0    Protein Albumin Urine 20  (A) Negative mg/dL    Urobilinogen Urine Normal Normal, 2.0 mg/dL    Nitrite Urine Negative Negative    Leukocyte Esterase Urine Negative Negative    Bacteria Urine Few (A) None Seen /HPF    RBC Urine >182 (H) <=2 /HPF    WBC Urine 3 <=5 /HPF    Squamous Epithelials Urine 1 <=1 /HPF    Mucus Urine Present (A) None Seen /LPF    Calcium Oxalate Crystals Urine Few (A) None Seen /HPF   XR Chest Port 1 View    Narrative    PROCEDURE:  XR CHEST PORT 1 VIEW    HISTORY:  chest pain, sob.     COMPARISON:  None.    FINDINGS:   The cardiac silhouette is normal in size. The pulmonary vasculature is  normal.  The lungs are clear. No pleural effusion or pneumothorax.      Impression    IMPRESSION:  No acute cardiopulmonary disease.      LUIS ANTONIO NASH MD         SYSTEM ID:  [id number]   US Lower Extremity Venous Duplex Bilateral    Narrative    Exam:US LOWER EXTREMITY VENOUS DUPLEX BILATERAL    History: Chest tightness 3 days post     Comparisons:none    Technique: Venous duplex ultrasonography of the bilateral lower  extremities was performed.     Findings: The common femoral veins, superficial femoral veins and  popliteal veins are fully compressible with spontaneous and  augmentable venous flow.           Impression    Impression: No evidence of deep venous thrombosis within the lower  extremities.    LUIS ANTONIO NASH MD         SYSTEM ID:  [id number]       Medications - No data to display    Assessments & Plan (with Medical Decision Making)   Pt nontoxic in NAD. Heart, lung, bowel sounds normal, abd soft,  nontender to palpation, nondistended. VSS, afebrile.     EKG appears normal.    She has WBC 12.8, hemoglobin slightly low at 10.9, these values seem consistent with recent pregnancy and .  CMP unremarkable.  Troponin is normal.    I offered a CT pulmonary embolism study however she declined as she is breast-feeding and when I tell her that she would have to wait to begin breast-feeding again after obtaining the study she declines.    I did obtain a chest x-ray which was read as normal and ultrasounds of bilateral lower extremities which were also read as normal.    We did discuss that in the situation CT scan is the best study to try and rule out pulmonary embolism however she is adamant that she does not want that study today.  Certainly, she has very reassuring vital signs and diagnostic studies that we obtained at this time and that is felt that she has a low likelihood of having a pulmonary embolism.    She feels comfortable going home.  I did place a referral for her to follow-up with PCP for reassessment.    Strict return precautions are given to the pt, they will return if symptoms are worsening or concerning. The pt understands and agrees with the plan and they are discharged.     Sam Alvarez PA-C    I have reviewed the nursing notes.    I have reviewed the findings, diagnosis, plan and need for follow up with the patient.       Discharge Medication List as of 2021  8:24 PM          Final diagnoses:   Chest pain       2021   [facility] AND Sam Zaman PA  21 0016

## 2021-07-22 LAB
ATRIAL RATE - MUSE: 74 BPM
DIASTOLIC BLOOD PRESSURE - MUSE: NORMAL MMHG
INTERPRETATION ECG - MUSE: NORMAL
P AXIS - MUSE: 50 DEGREES
PR INTERVAL - MUSE: 120 MS
QRS DURATION - MUSE: 64 MS
QT - MUSE: 350 MS
QTC - MUSE: 388 MS
R AXIS - MUSE: -4 DEGREES
SYSTOLIC BLOOD PRESSURE - MUSE: NORMAL MMHG
T AXIS - MUSE: 42 DEGREES
VENTRICULAR RATE- MUSE: 74 BPM

## 2021-07-22 ASSESSMENT — ENCOUNTER SYMPTOMS
CHILLS: 0
FEVER: 0
BRUISES/BLEEDS EASILY: 0
BACK PAIN: 0
ABDOMINAL PAIN: 0
HEMATURIA: 0
WOUND: 0
CONFUSION: 0
SHORTNESS OF BREATH: 1
CHEST TIGHTNESS: 0
ADENOPATHY: 0

## 2021-07-22 NOTE — DISCHARGE INSTRUCTIONS
Get plenty of fluids and rest.  As we discussed, all of your lab work, physical exam and imaging appear very well.  We did discuss obtaining a CT scan of your chest however decided not to get that as it would interfere with breast-feeding.  Currently there is no obvious sign of blood clot that is occurring.  I would recommend you continue with Tylenol and ibuprofen.  If you are having worsening or concerning symptoms please return for further evaluation, otherwise, referrals placed follow-up with PCP for reassessment.

## 2021-07-30 ENCOUNTER — MEDICAL CORRESPONDENCE (OUTPATIENT)
Dept: HEALTH INFORMATION MANAGEMENT | Facility: OTHER | Age: 26
End: 2021-07-30

## 2021-08-03 ENCOUNTER — OFFICE VISIT (OUTPATIENT)
Dept: OBGYN | Facility: OTHER | Age: 26
End: 2021-08-03
Attending: OBSTETRICS & GYNECOLOGY
Payer: COMMERCIAL

## 2021-08-03 ENCOUNTER — TELEPHONE (OUTPATIENT)
Dept: OBGYN | Facility: OTHER | Age: 26
End: 2021-08-03

## 2021-08-03 VITALS
WEIGHT: 162 LBS | SYSTOLIC BLOOD PRESSURE: 128 MMHG | RESPIRATION RATE: 18 BRPM | HEART RATE: 106 BPM | DIASTOLIC BLOOD PRESSURE: 84 MMHG | BODY MASS INDEX: 27.81 KG/M2

## 2021-08-03 DIAGNOSIS — R30.0 DYSURIA: Primary | ICD-10-CM

## 2021-08-03 LAB
ALBUMIN UR-MCNC: NEGATIVE MG/DL
APPEARANCE UR: CLEAR
BACTERIA #/AREA URNS HPF: ABNORMAL /HPF
BILIRUB UR QL STRIP: NEGATIVE
COLOR UR AUTO: YELLOW
GLUCOSE UR STRIP-MCNC: NEGATIVE MG/DL
HGB UR QL STRIP: ABNORMAL
KETONES UR STRIP-MCNC: NEGATIVE MG/DL
LEUKOCYTE ESTERASE UR QL STRIP: ABNORMAL
MUCOUS THREADS #/AREA URNS LPF: PRESENT /LPF
NITRATE UR QL: NEGATIVE
PH UR STRIP: 5.5 [PH] (ref 5–9)
RBC URINE: 4 /HPF
SP GR UR STRIP: 1.02 (ref 1–1.03)
UROBILINOGEN UR STRIP-MCNC: NORMAL MG/DL
WBC URINE: 31 /HPF

## 2021-08-03 PROCEDURE — 81003 URINALYSIS AUTO W/O SCOPE: CPT | Mod: ZL | Performed by: OBSTETRICS & GYNECOLOGY

## 2021-08-03 PROCEDURE — 87086 URINE CULTURE/COLONY COUNT: CPT | Mod: ZL | Performed by: OBSTETRICS & GYNECOLOGY

## 2021-08-03 PROCEDURE — 99024 POSTOP FOLLOW-UP VISIT: CPT | Performed by: OBSTETRICS & GYNECOLOGY

## 2021-08-03 RX ORDER — NITROFURANTOIN 25; 75 MG/1; MG/1
100 CAPSULE ORAL 2 TIMES DAILY
Qty: 6 CAPSULE | Refills: 0 | Status: SHIPPED | OUTPATIENT
Start: 2021-08-03 | End: 2021-08-06

## 2021-08-03 ASSESSMENT — PAIN SCALES - GENERAL: PAINLEVEL: NO PAIN (0)

## 2021-08-03 NOTE — NURSING NOTE
"Chief Complaint   Patient presents with     Surgical Followup     2 week post    Patient presents to clinic for 2 week post-op; . No concerns.    Initial /84 (BP Location: Right arm, Patient Position: Sitting, Cuff Size: Adult Regular)   Pulse 106   Resp 18   Wt 73.5 kg (162 lb)   LMP 10/02/2020 (Exact Date)   BMI 27.81 kg/m   Estimated body mass index is 27.81 kg/m  as calculated from the following:    Height as of 21: 1.626 m (5' 4\").    Weight as of this encounter: 73.5 kg (162 lb).  Medication Reconciliation: complete    PO CALABRESE, LPN  "

## 2021-08-03 NOTE — TELEPHONE ENCOUNTER
Patient verification of name and . Discussed UA result, reflex to culture. Discussed antibiotic use and duration. Advised patient to call back if symptoms worsen. Discussed will contact patient if antibiotic switch is deemed necessary from pending culture. Patient verbalized understanding, no further questions at this time. Tatum Cuellar RN ....................  8/3/2021   4:31 PM

## 2021-08-03 NOTE — TELEPHONE ENCOUNTER
Dania called and requested to have a nurse call her today about some questions she has about her new medication she was given.  Nay Mcelroy on 8/3/2021 at 4:06 PM

## 2021-08-03 NOTE — PROGRESS NOTES
Dania Bhatti presents todayfor a postop checkup at 2 week(s) after  section    S: Bowels and bladder are functioning normally, no abnormal bleeding or pain. No concerns about the incision(s). Some sporadic pain with urination.    Current Outpatient Medications   Medication     Prenatal Vit-Fe Fumarate-FA (PRENATAL MULTIVITAMIN  PLUS IRON) 27-1 MG TABS     ibuprofen (ADVIL/MOTRIN) 600 MG tablet     oxyCODONE (ROXICODONE) 5 MG tablet     senna-docusate (SENOKOT-S/PERICOLACE) 8.6-50 MG tablet     No current facility-administered medications for this visit.     No Known Allergies  No past medical history on file.  Past Surgical History:   Procedure Laterality Date      SECTION N/A 2021    Procedure:  SECTION;  Surgeon: Oh Hong MD;  Location:  OR       COMPLETE REVIEW OF SYSTEMS: Neg except as above    O: /84 (BP Location: Right arm, Patient Position: Sitting, Cuff Size: Adult Regular)   Pulse 106   Resp 18   Wt 73.5 kg (162 lb)   LMP 10/02/2020 (Exact Date)   BMI 27.81 kg/m   Body mass index is 27.81 kg/m .    EXAM:  NAD  Incision CDI      I/P:  Doing well  (R30.0) Dysuria  (primary encounter diagnosis)  Comment:   Plan: UA with Microscopic reflex to Culture,         CANCELED: UA reflex to Microscopic and Culture          WIll notify of UA when results available.        Oh Hong MD FACOG  9:07 AM 8/3/2021

## 2021-08-04 LAB — BACTERIA UR CULT: NORMAL

## 2021-08-30 ENCOUNTER — PRENATAL OFFICE VISIT (OUTPATIENT)
Dept: OBGYN | Facility: OTHER | Age: 26
End: 2021-08-30
Attending: OBSTETRICS & GYNECOLOGY
Payer: COMMERCIAL

## 2021-08-30 VITALS
DIASTOLIC BLOOD PRESSURE: 82 MMHG | BODY MASS INDEX: 26.97 KG/M2 | WEIGHT: 157.1 LBS | RESPIRATION RATE: 18 BRPM | HEART RATE: 104 BPM | SYSTOLIC BLOOD PRESSURE: 122 MMHG

## 2021-08-30 DIAGNOSIS — Z12.4 SCREENING FOR CERVICAL CANCER: Primary | ICD-10-CM

## 2021-08-30 PROCEDURE — G0123 SCREEN CERV/VAG THIN LAYER: HCPCS | Performed by: OBSTETRICS & GYNECOLOGY

## 2021-08-30 PROCEDURE — 99207 PR POST-PARTUM 6 WK VISIT - GICH ONLY: CPT | Performed by: OBSTETRICS & GYNECOLOGY

## 2021-08-30 ASSESSMENT — PAIN SCALES - GENERAL: PAINLEVEL: NO PAIN (0)

## 2021-08-30 NOTE — PROGRESS NOTES
CC: postpartum exam at 6 weeks from delivery    HPI: Dania Bhatti presents for postpartum exam. Baby was born by  delivery. Complications included none.  Mood:OK    Breastfeeding:yes  Incision(s): extruding some staples  Bleeding: no  Birthcontrol: decliens    No past medical history on file.  Past Surgical History:   Procedure Laterality Date      SECTION N/A 2021    Procedure:  SECTION;  Surgeon: Oh Hong MD;  Location:  OR     Current Outpatient Medications   Medication     Prenatal Vit-Fe Fumarate-FA (PRENATAL MULTIVITAMIN  PLUS IRON) 27-1 MG TABS     No current facility-administered medications for this visit.      No Known Allergies    REVIEW OF SYSTEMS:  Neg for bowel, bladder, and breast    O: LMP 10/02/2020 (Exact Date)   There is no height or weight on file to calculate BMI.    Exam:  Constitutional: healthy, alert, active and no distress  Pelvic: Normal BUSE, vulva appears normal, vaginal and cervix are normal. Pap obtained. Uterus is normal size, shape position and mobility without adnexal mass or tenderness. Chaperone was present.  Abdomen NT, ND, incision is CDI with some scabbed over areas on both sides c/w staple extrusion      [place] Depression Scale  Thoughts of Harming Self:    Total Score:          No results found for any visits on 21.      I/P:  Postpartum visit.    Resume annual preventive healthcare. Continue PNV's until done with childbearing.    RTC prn    Oh Hong MD FACOG  11:18 AM 2021

## 2021-08-30 NOTE — NURSING NOTE
"Chief Complaint   Patient presents with     Postpartum Care     Patient presents to clinic for post partum check. Has a couple spots on incision she wants looked at, otherwise doing well.    Initial /82 (BP Location: Right arm, Patient Position: Sitting, Cuff Size: Adult Regular)   Pulse 104   Resp 18   Wt 71.3 kg (157 lb 1.6 oz)   LMP 10/02/2020 (Exact Date)   BMI 26.97 kg/m   Estimated body mass index is 26.97 kg/m  as calculated from the following:    Height as of 7/21/21: 1.626 m (5' 4\").    Weight as of this encounter: 71.3 kg (157 lb 1.6 oz).  Medication Reconciliation: complete    PO CALABRESE, LPN  "

## 2021-09-02 LAB
BKR LAB AP GYN ADEQUACY: NORMAL
BKR LAB AP GYN INTERPRETATION: NORMAL
BKR LAB AP HPV REFLEX: NO
BKR LAB AP PREVIOUS ABNORMAL: NORMAL
PATH REPORT.RELEVANT HX SPEC: NORMAL

## 2021-09-20 ENCOUNTER — MEDICAL CORRESPONDENCE (OUTPATIENT)
Dept: HEALTH INFORMATION MANAGEMENT | Facility: OTHER | Age: 26
End: 2021-09-20

## 2021-12-03 ENCOUNTER — MEDICAL CORRESPONDENCE (OUTPATIENT)
Dept: HEALTH INFORMATION MANAGEMENT | Facility: OTHER | Age: 26
End: 2021-12-03
Payer: COMMERCIAL

## 2023-11-27 ENCOUNTER — VIRTUAL VISIT (OUTPATIENT)
Dept: OBGYN | Facility: OTHER | Age: 28
End: 2023-11-27
Attending: OBSTETRICS & GYNECOLOGY
Payer: COMMERCIAL

## 2023-11-27 DIAGNOSIS — O36.80X0 ENCOUNTER TO DETERMINE FETAL VIABILITY OF PREGNANCY, SINGLE OR UNSPECIFIED FETUS: Primary | ICD-10-CM

## 2023-11-27 PROCEDURE — 99207 PR OB VISIT-NO CHARGE - GICH ONLY: CPT

## 2023-11-27 ASSESSMENT — ANXIETY QUESTIONNAIRES
GAD7 TOTAL SCORE: 0
3. WORRYING TOO MUCH ABOUT DIFFERENT THINGS: NOT AT ALL
2. NOT BEING ABLE TO STOP OR CONTROL WORRYING: NOT AT ALL
GAD7 TOTAL SCORE: 0
1. FEELING NERVOUS, ANXIOUS, OR ON EDGE: NOT AT ALL
6. BECOMING EASILY ANNOYED OR IRRITABLE: NOT AT ALL
5. BEING SO RESTLESS THAT IT IS HARD TO SIT STILL: NOT AT ALL
7. FEELING AFRAID AS IF SOMETHING AWFUL MIGHT HAPPEN: NOT AT ALL

## 2023-11-27 ASSESSMENT — PATIENT HEALTH QUESTIONNAIRE - PHQ9
5. POOR APPETITE OR OVEREATING: NOT AT ALL
SUM OF ALL RESPONSES TO PHQ QUESTIONS 1-9: 0

## 2023-11-27 NOTE — PROGRESS NOTES
Verbal consent obtained for telephone visit. Total length of call: 20 min    HPI:    This is a 28 year old female patient,  who was called today for OB Intake visit. Patient reports positive pregnancy test at home.     Obstetrical history and OB Demographics updated to the best of this nurse's ability based on patient report. PHQ-9 depression screening and routine Domestic Abuse screening completed. All immediate questions and concerns answered.    FOOD SECURITY SCREENING QUESTIONS  Hunger Vital Signs:  Within the past 12 months we worried whether our food would run out before we got money to buy more. Never  Within the past 12 months the food we bought just didn't last and we didn't have money to get more. Never    Last menstrual period is reported as Patient's last menstrual period was 10/09/2023. SANDIE based on LMP is Estimated Date of Delivery: Jul 15, 2024.  Her cycles are regular.  Her last menstrual period was normal.   Since her LMP, she has experienced  nausea, fatigue, loss of appetite, and urinary frequency.       OBSTETRIC HISTORY:    OB History    Para Term  AB Living   2 1 1 0 0 1   SAB IAB Ectopic Multiple Live Births   0 0 0 0 1      # Outcome Date GA Lbr Nathan/2nd Weight Sex Delivery Anes PTL Lv   2 Current            1 Term 21 41w2d  3.479 kg (7 lb 10.7 oz) F CS-LTranv Spinal N LI      Name: CARLO SHRESTHA-JOEY      Apgar1: 9  Apgar5: 9       Age of first pregnancy: 25  Previous OB Provider: Dr. Hong  Previous Delivering Clinic: [facility]  Release of Records: N/A    Current delivery plan: [facility]  Preferred OB Provider: Dr. Mercedez Saldivar  Current Primary Care Provider: Isaias Harmon M.D.   Pediatrician: Dr. Singh    Additional History: None    Have you travelled during the pregnancy?No  Have your sexual partner(s) travelled during the pregnancy?No      HISTORY:   Planned Pregnancy: Yes  Marital Status:   Occupation: Stay at home mom  Living in Household: Spouse and  Children    Father of the baby is involved.   Family and father of baby is supportive of current pregnancy.  Past Medical History of Father of Baby:Insulin Dependent and Age Onset 21    Past History:  Her past medical history History reviewed. No pertinent past medical history..      Her past surgical history:   Past Surgical History:   Procedure Laterality Date     SECTION N/A 2021    Procedure:  SECTION;  Surgeon: Oh Hong MD;  Location: [facility] OR       She has a history of  prior  section    Since her last LMP she denies use of alcohol, tobacco and street drugs.    Pap smear history: last pap 2021, next 2024    STD/STI history: No STD history    STD/STI symptoms: no noticeable symptoms     Past medical, surgical, social and family history were reviewed and updated in EPIC.    Medications reviewed by this nurse. Current medication list:  Current Outpatient Medications   Medication Sig Dispense Refill    Prenatal Vit-Fe Fumarate-FA (PRENATAL MULTIVITAMIN  PLUS IRON) 27-1 MG TABS Take 1 tablet by mouth daily       The following medications were recommended to be discontinued due to Pregnancy Category D status: none  Patient informed to contact her primary care provider as soon as possible to discuss a safer alternative.    Risk factors:  Moderate and moderately severe risks (consult with OB/Gyn)  Previous fetal or  demise: No  History of  delivery: No  History of heart disease Class I: No  Severe anemia, unresponsive to iron therapy: No  Pelvic mass or neoplasm: No  Previous : Yes  Hyper/hypothyroidism: No  History of postpartum hemorrhage requiring transfusion:No  History of Placenta Accreta: No    High Risk (Pregnancy managed by OB/Gyn)  Multiple pregnancy: No  Pre-gestational diabetes: No  Chronic Hypertension: No  Renal Failure: No  Heart disease, class II or greater: No  Rh Isoimmunization: No  Chronic active hepatitis: No  Convulsive disorder, poorly  controlled: No  Isoimmune thrombocytopenia: No  Pre-term premature rupture of membranes: No  Lupus or other autoimmune disorder: No  Human Immunodeficiency Virus: No      ASSESSMENT/PLAN:       ICD-10-CM    1. Encounter to determine fetal viability of pregnancy, single or unspecified fetus  O36.80X0           28 year old , 7w0d of pregnancy with SANDIE of 7/15/2024, by Last Menstrual Period    Per standing orders and scope of practice of this nurse, patient will have the following orders placed and completed prior to initial OB visit with the appropriate provider:    --early ultrasound for dating and viability ordered for 6+ weeks gestation based on LMP    --Quantitative Beta HCG and progesterone monitoring if indicated    Counseling given:     - Recommended weight gain for pregnancy: 25-35 lbs.   BMI < 18.5  28-40 lbs   18.5 - 24.9 25-35   25 - 29.9 15-25   > 30  < 15       PLAN/PATIENT INSTRUCTIONS:    Normal exercise.  Normal sexual activity.  Prenatal vitamins.  Anticipated weight gain.    follow-up appointment with Dr. Mercedez Saldivar for pre- care and take multivitamin or pre-ja vitamins    Gianna Kiser RN.................................................. 2023 2:15 PM

## 2023-12-07 LAB
ABO/RH(D): NORMAL
ANTIBODY SCREEN: NEGATIVE
SPECIMEN EXPIRATION DATE: NORMAL

## 2023-12-08 ENCOUNTER — HOSPITAL ENCOUNTER (OUTPATIENT)
Dept: ULTRASOUND IMAGING | Facility: OTHER | Age: 28
Discharge: HOME | End: 2023-12-08
Attending: STUDENT IN AN ORGANIZED HEALTH CARE EDUCATION/TRAINING PROGRAM
Payer: COMMERCIAL

## 2023-12-08 ENCOUNTER — PRENATAL OFFICE VISIT (OUTPATIENT)
Dept: OBGYN | Facility: OTHER | Age: 28
End: 2023-12-08
Payer: COMMERCIAL

## 2023-12-08 VITALS
HEART RATE: 80 BPM | DIASTOLIC BLOOD PRESSURE: 58 MMHG | BODY MASS INDEX: 23 KG/M2 | SYSTOLIC BLOOD PRESSURE: 102 MMHG | WEIGHT: 134 LBS

## 2023-12-08 DIAGNOSIS — O36.80X0 ENCOUNTER TO DETERMINE FETAL VIABILITY OF PREGNANCY, SINGLE OR UNSPECIFIED FETUS: ICD-10-CM

## 2023-12-08 DIAGNOSIS — Z34.91 ENCOUNTER FOR PREGNANCY RELATED EXAMINATION, FIRST TRIMESTER: Primary | ICD-10-CM

## 2023-12-08 DIAGNOSIS — Z23 NEEDS FLU SHOT: ICD-10-CM

## 2023-12-08 LAB
ALBUMIN UR-MCNC: 10 MG/DL
APPEARANCE UR: CLEAR
BASOPHILS # BLD AUTO: 0 10E3/UL (ref 0–0.2)
BASOPHILS NFR BLD AUTO: 0 %
BILIRUB UR QL STRIP: NEGATIVE
C TRACH DNA SPEC QL PROBE+SIG AMP: NEGATIVE
COLOR UR AUTO: ABNORMAL
EOSINOPHIL # BLD AUTO: 0.1 10E3/UL (ref 0–0.7)
EOSINOPHIL NFR BLD AUTO: 1 %
ERYTHROCYTE [DISTWIDTH] IN BLOOD BY AUTOMATED COUNT: 12.3 % (ref 10–15)
GLUCOSE UR STRIP-MCNC: NEGATIVE MG/DL
HCT VFR BLD AUTO: 37 % (ref 35–47)
HGB BLD-MCNC: 13.2 G/DL (ref 11.7–15.7)
HGB UR QL STRIP: NEGATIVE
IMM GRANULOCYTES # BLD: 0 10E3/UL
IMM GRANULOCYTES NFR BLD: 0 %
KETONES UR STRIP-MCNC: NEGATIVE MG/DL
LEUKOCYTE ESTERASE UR QL STRIP: NEGATIVE
LYMPHOCYTES # BLD AUTO: 2.6 10E3/UL (ref 0.8–5.3)
LYMPHOCYTES NFR BLD AUTO: 26 %
MCH RBC QN AUTO: 30.3 PG (ref 26.5–33)
MCHC RBC AUTO-ENTMCNC: 35.7 G/DL (ref 31.5–36.5)
MCV RBC AUTO: 85 FL (ref 78–100)
MONOCYTES # BLD AUTO: 0.8 10E3/UL (ref 0–1.3)
MONOCYTES NFR BLD AUTO: 7 %
MUCOUS THREADS #/AREA URNS LPF: PRESENT /LPF
N GONORRHOEA DNA SPEC QL NAA+PROBE: NEGATIVE
NEUTROPHILS # BLD AUTO: 6.8 10E3/UL (ref 1.6–8.3)
NEUTROPHILS NFR BLD AUTO: 66 %
NITRATE UR QL: NEGATIVE
NRBC # BLD AUTO: 0 10E3/UL
NRBC BLD AUTO-RTO: 0 /100
PH UR STRIP: 7 [PH] (ref 5–9)
PLATELET # BLD AUTO: 315 10E3/UL (ref 150–450)
RBC # BLD AUTO: 4.36 10E6/UL (ref 3.8–5.2)
RBC URINE: <1 /HPF
SP GR UR STRIP: 1.02 (ref 1–1.03)
UROBILINOGEN UR STRIP-MCNC: NORMAL MG/DL
WBC # BLD AUTO: 10.3 10E3/UL (ref 4–11)
WBC URINE: 1 /HPF

## 2023-12-08 PROCEDURE — 86762 RUBELLA ANTIBODY: CPT | Mod: ZL

## 2023-12-08 PROCEDURE — 86850 RBC ANTIBODY SCREEN: CPT | Mod: ZL

## 2023-12-08 PROCEDURE — 36415 COLL VENOUS BLD VENIPUNCTURE: CPT | Mod: ZL

## 2023-12-08 PROCEDURE — 87491 CHLMYD TRACH DNA AMP PROBE: CPT | Mod: ZL

## 2023-12-08 PROCEDURE — 86780 TREPONEMA PALLIDUM: CPT | Mod: ZL

## 2023-12-08 PROCEDURE — 86803 HEPATITIS C AB TEST: CPT | Mod: ZL

## 2023-12-08 PROCEDURE — 85025 COMPLETE CBC W/AUTO DIFF WBC: CPT | Mod: ZL

## 2023-12-08 PROCEDURE — 99207 PR OB VISIT-NO CHARGE - GICH ONLY: CPT

## 2023-12-08 PROCEDURE — 86901 BLOOD TYPING SEROLOGIC RH(D): CPT | Mod: ZL

## 2023-12-08 PROCEDURE — 87340 HEPATITIS B SURFACE AG IA: CPT | Mod: ZL

## 2023-12-08 PROCEDURE — 81003 URINALYSIS AUTO W/O SCOPE: CPT | Mod: ZL

## 2023-12-08 PROCEDURE — 87389 HIV-1 AG W/HIV-1&-2 AB AG IA: CPT | Mod: ZL

## 2023-12-08 PROCEDURE — 76801 OB US < 14 WKS SINGLE FETUS: CPT

## 2023-12-08 PROCEDURE — 87086 URINE CULTURE/COLONY COUNT: CPT | Mod: ZL

## 2023-12-08 NOTE — PROGRESS NOTES
"New Obstetrics Visit    HPI: 28 year old  at 8w4d by LMP CW US here today for initial OB visit. Her LMP was 10/9/23. This was a planned pregnancy. She has been having some nausea but has been managing well at home with this.     OBHx  OB History    Para Term  AB Living   2 1 1 0 0 1   SAB IAB Ectopic Multiple Live Births   0 0 0 0 1      # Outcome Date GA Lbr Nathan/2nd Weight Sex Delivery Anes PTL Lv   2 Current            1 Term 21 41w2d  3.479 kg (7 lb 10.7 oz) F CS-LTranv Spinal N LI      Name: HENRIETTA,FEMALE-JOEY      Apgar1: 9  Apgar5: 9       GYN History  - Menses: Patient's last menstrual period was 10/09/2023..   - Pap Smears: 2024 next due    No results found for: \"PAP\"  - Sexual Activity/Concerns: None  - Hx STIs/UTIs: none    PMHx: No past medical history on file.   PSHx:   Past Surgical History:   Procedure Laterality Date     SECTION N/A 2021    Procedure:  SECTION;  Surgeon: Oh Hong MD;  Location:  OR      Meds:   Current Outpatient Medications   Medication    Prenatal Vit-Fe Fumarate-FA (PRENATAL MULTIVITAMIN  PLUS IRON) 27-1 MG TABS     No current facility-administered medications for this visit.     Allergies:   No Known Allergies    SocHx:   Social History     Tobacco Use    Smoking status: Never    Smokeless tobacco: Never   Vaping Use    Vaping Use: Never used   Substance Use Topics    Alcohol use: Not Currently    Drug use: Never         FamHx: No family history on file.     ROS: 10-Point ROS negative except as noted in HPI      Physical Exam  LMP 10/09/2023   There is no height or weight on file to calculate BMI.  Gen: Well-appearing, NAD  HEENT: Normocephalic, atraumatic  Neck: Thyroid is not enlarged, no appreciable masses palpated. Non-tender  CV:  RRR, no m/r/g auscultated  Pulm: CTAB, no w/r/r auscultated  Abd: Soft, non-tender, non-distended  Ext: No LE edema, extremities warm and well perfused  Pelvic:  Normal appearing external " female genitalia. No vaginal lesions. Small discharge. Cervix normal, no lesions. Uterus is small, mobile, non-tender. No adnexal tenderness or masses    Labs:  Collected today    Assessment/Plan:  Ms. Dania Bhatti is a 28 year old  at 8w4d by LMP, here for new OB visit. Pregnancy is uncomplicated besides prior C section. Desires RCS. FOB with type 1 diabetes.   1. PNC: New OB Labs ord'd  2. Genetics: desires next visit  3. Imaging: dating US at 8w6d  4. Immunizations: declines FLU    Follow up in 4 weeks.      NINI Tavares CNP

## 2023-12-08 NOTE — NURSING NOTE
Chief Complaint   Patient presents with    Prenatal Care     OB PX        Medication Reconciliation: complete    Patient presents to the clinic for OB PX patient stated that she has been having nausea.     Lynne Sage LPN         History:  Elvia Alcocer is a 21 month old female who presents to the office with fever up to 104 that started this morning. The patient has had runny nose and cough over the past week though appetite has remained consistent. She has had no vomiting. No shortness of breath noted. She has been receiving ibuprofen for fever. There are no known ill contacts although her  did have influenza at 1 month ago. Past Medical History:  Problem list was reviewed. No current outpatient medications on file. No current facility-administered medications for this visit. Allergies:  No allergies to medications. Physical exam:  Vitals:    02/19/20 1457   Pulse: 136   Resp: 24   Temp: (!) 100.8 Â°F (38.2 Â°C)     General appearance:  Patient is alert and well nourished in appearance. Eyes: No scleral injection. No drainage. Ears:  Right tympanic membrane is pale and shiny. Left tympanic membrane is injected dull and thickened. Nose: Nasal mucosa is moist and pink with clear discharge. Throat: No erythema or exudate of posterior pharynx. Lungs: Clear to auscultation bilaterally. Heart: Regular rate and rhythm. Assessment:  Left otitis media, acute                          Viral URI    Plan:  The patient was started on Omnicef 250 mg in 5 mL suspension to take 4 mL daily for 10 days. She is to follow-up in 1 month for re-evaluation and family is to contact the office if problems or no improvement in fever over the next 2 days.

## 2023-12-10 LAB
BACTERIA UR CULT: NO GROWTH
T PALLIDUM AB SER QL: NONREACTIVE

## 2023-12-11 LAB
HBV SURFACE AG SERPL QL IA: NONREACTIVE
HCV AB SERPL QL IA: NONREACTIVE
HIV 1+2 AB+HIV1 P24 AG SERPL QL IA: NONREACTIVE
RUBV IGG SERPL QL IA: 1.9 INDEX
RUBV IGG SERPL QL IA: POSITIVE

## 2023-12-17 ENCOUNTER — HEALTH MAINTENANCE LETTER (OUTPATIENT)
Age: 28
End: 2023-12-17

## 2024-01-08 ENCOUNTER — PRENATAL OFFICE VISIT (OUTPATIENT)
Dept: OBGYN | Facility: OTHER | Age: 29
End: 2024-01-08
Attending: STUDENT IN AN ORGANIZED HEALTH CARE EDUCATION/TRAINING PROGRAM
Payer: COMMERCIAL

## 2024-01-08 VITALS
SYSTOLIC BLOOD PRESSURE: 118 MMHG | DIASTOLIC BLOOD PRESSURE: 78 MMHG | HEIGHT: 65 IN | BODY MASS INDEX: 22.84 KG/M2 | HEART RATE: 80 BPM | WEIGHT: 137.1 LBS

## 2024-01-08 DIAGNOSIS — Z98.891 HISTORY OF CESAREAN SECTION: ICD-10-CM

## 2024-01-08 DIAGNOSIS — Z34.92 SECOND TRIMESTER PREGNANCY: Primary | ICD-10-CM

## 2024-01-08 DIAGNOSIS — Z34.91 ENCOUNTER FOR PREGNANCY RELATED EXAMINATION, FIRST TRIMESTER: ICD-10-CM

## 2024-01-08 PROCEDURE — 36415 COLL VENOUS BLD VENIPUNCTURE: CPT | Mod: ZL | Performed by: STUDENT IN AN ORGANIZED HEALTH CARE EDUCATION/TRAINING PROGRAM

## 2024-01-08 PROCEDURE — 99207 PR OB VISIT-NO CHARGE - GICH ONLY: CPT | Performed by: STUDENT IN AN ORGANIZED HEALTH CARE EDUCATION/TRAINING PROGRAM

## 2024-01-08 NOTE — PROGRESS NOTES
"Return OB Visit    S: Ms. Bhatti is feeling well today. She has no acute concerns. Denies leaking of fluid, vaginal bleeding, painful contractions.    O:   /78   Pulse 80   Ht 1.638 m (5' 4.5\")   Wt 62.2 kg (137 lb 1.6 oz)   LMP 10/09/2023   BMI 23.17 kg/m      Gen: Well-appearing, NAD     bpm    Assessment:  Ms. Dania Bhatti is a 28 year old yo  here for an OB follow up visit. This pregnancy is complicated by prior .    Plan:  # Routine Prenatal Care  -- Dating: LMP=8 week US SANDIE: 7/15/2024  -- PNLs:    O+, Ab screen neg   RPR nr   Hep B S Ag neg   Hep C neg   Rubella immune   HIV neg    -- Genetic Screening: [employer] collected today   -- Anatomy US: Planned for approximately 20 weeks gestation. Currently no indication for Level II  -- Immunizations: declined flu, Tdap at approximately 27 weeks gestation  -- 3rd TM labs including CBC, RPR and 1 hr GTT: Planned for after 28 weeks gestation  -- GBS: Planned for 36 weeks  -- Postpartum Planning: To be discussed   -- Delivery Planning: No indication for early IOL at this time. To be discussed continually  -- Return to clinic in 4 weeks for OB follow up visit  -- Planning to do at next visit: confirm US is ordered    # History of   -- 41 weeks, 3400 grams  -- Plans to do a repeat  at 39 weeks        "

## 2024-01-15 LAB — SCANNED LAB RESULT: NORMAL

## 2024-01-31 NOTE — PROGRESS NOTES
Return OB Visit    S: Ms. Bhatti is feeling well today. She has no acute concerns. Denies leaking of fluid, vaginal bleeding, painful contractions. She is starting to feel fluttering    O:   /80   Pulse 78   Wt 63.3 kg (139 lb 8 oz)   LMP 10/09/2023   BMI 23.58 kg/m        Gen: Well-appearing, NAD   bpm    Assessment:  Ms. Dania Bhatti is a 28 year old yo  here for an OB follow up visit. This pregnancy is complicated by prior .     Plan:  # Routine Prenatal Care  -- Dating: LMP=8 week US SANDIE: 7/15/2024  -- PNLs:                O+, Ab screen neg               RPR nr               Hep B S Ag neg               Hep C neg               Rubella immune               HIV neg     -- Genetic Screening: NIPT low risk XY  -- Anatomy US: scheduled 3/4/24  -- Immunizations: declined flu, Tdap at approximately 27 weeks gestation  -- 3rd TM labs including CBC, RPR and 1 hr GTT: Planned for after 28 weeks gestation  -- GBS: Planned for 36 weeks  -- Postpartum Planning: To be discussed   -- Delivery Planning: No indication for early IOL at this time. To be discussed continually  -- Return to clinic in 4 weeks for OB follow up visit  -- Planning to do at next visit: follow up US     # History of   -- 41 weeks, 3400 grams due to fetal intolerance  -- Plans to do a repeat  at 39 weeks    # Easy bruising  -- Mom with history of bad thrombocytopenia in her pregnancies

## 2024-02-05 ENCOUNTER — PRENATAL OFFICE VISIT (OUTPATIENT)
Dept: OBGYN | Facility: OTHER | Age: 29
End: 2024-02-05
Attending: STUDENT IN AN ORGANIZED HEALTH CARE EDUCATION/TRAINING PROGRAM
Payer: COMMERCIAL

## 2024-02-05 VITALS
SYSTOLIC BLOOD PRESSURE: 120 MMHG | DIASTOLIC BLOOD PRESSURE: 80 MMHG | BODY MASS INDEX: 23.58 KG/M2 | HEART RATE: 78 BPM | WEIGHT: 139.5 LBS

## 2024-02-05 DIAGNOSIS — Z98.891 HISTORY OF CESAREAN SECTION: ICD-10-CM

## 2024-02-05 DIAGNOSIS — Z34.92 SECOND TRIMESTER PREGNANCY: Primary | ICD-10-CM

## 2024-02-05 LAB
ERYTHROCYTE [DISTWIDTH] IN BLOOD BY AUTOMATED COUNT: 13.1 % (ref 10–15)
HCT VFR BLD AUTO: 35.2 % (ref 35–47)
HGB BLD-MCNC: 12.2 G/DL (ref 11.7–15.7)
MCH RBC QN AUTO: 30.2 PG (ref 26.5–33)
MCHC RBC AUTO-ENTMCNC: 34.7 G/DL (ref 31.5–36.5)
MCV RBC AUTO: 87 FL (ref 78–100)
PLATELET # BLD AUTO: 261 10E3/UL (ref 150–450)
RBC # BLD AUTO: 4.04 10E6/UL (ref 3.8–5.2)
WBC # BLD AUTO: 10 10E3/UL (ref 4–11)

## 2024-02-05 PROCEDURE — 36415 COLL VENOUS BLD VENIPUNCTURE: CPT | Mod: ZL | Performed by: STUDENT IN AN ORGANIZED HEALTH CARE EDUCATION/TRAINING PROGRAM

## 2024-02-05 PROCEDURE — 99207 PR OB VISIT-NO CHARGE - GICH ONLY: CPT | Performed by: STUDENT IN AN ORGANIZED HEALTH CARE EDUCATION/TRAINING PROGRAM

## 2024-02-05 PROCEDURE — 85018 HEMOGLOBIN: CPT | Mod: ZL | Performed by: STUDENT IN AN ORGANIZED HEALTH CARE EDUCATION/TRAINING PROGRAM

## 2024-02-05 NOTE — NURSING NOTE
Pt presents to clinic today for prenatal care 17w0d. Pt denies any contractions, bleeding, or leakage of fluid at this time.      Medication Reconciliation: complete  Shanta Karimi LPN

## 2024-03-04 ENCOUNTER — PRENATAL OFFICE VISIT (OUTPATIENT)
Dept: OBGYN | Facility: OTHER | Age: 29
End: 2024-03-04
Attending: STUDENT IN AN ORGANIZED HEALTH CARE EDUCATION/TRAINING PROGRAM
Payer: COMMERCIAL

## 2024-03-04 ENCOUNTER — HOSPITAL ENCOUNTER (OUTPATIENT)
Dept: ULTRASOUND IMAGING | Facility: OTHER | Age: 29
Discharge: HOME | End: 2024-03-04
Attending: STUDENT IN AN ORGANIZED HEALTH CARE EDUCATION/TRAINING PROGRAM
Payer: COMMERCIAL

## 2024-03-04 VITALS
WEIGHT: 145.6 LBS | DIASTOLIC BLOOD PRESSURE: 60 MMHG | HEART RATE: 86 BPM | SYSTOLIC BLOOD PRESSURE: 112 MMHG | BODY MASS INDEX: 24.61 KG/M2

## 2024-03-04 DIAGNOSIS — Z98.891 HISTORY OF CESAREAN SECTION: Primary | ICD-10-CM

## 2024-03-04 DIAGNOSIS — Z98.891 HISTORY OF CESAREAN SECTION: ICD-10-CM

## 2024-03-04 DIAGNOSIS — K21.00 GASTROESOPHAGEAL REFLUX DISEASE WITH ESOPHAGITIS WITHOUT HEMORRHAGE: ICD-10-CM

## 2024-03-04 DIAGNOSIS — Z34.92 SECOND TRIMESTER PREGNANCY: ICD-10-CM

## 2024-03-04 PROCEDURE — 99207 PR OB VISIT-NO CHARGE - GICH ONLY: CPT | Performed by: STUDENT IN AN ORGANIZED HEALTH CARE EDUCATION/TRAINING PROGRAM

## 2024-03-04 PROCEDURE — 76805 OB US >/= 14 WKS SNGL FETUS: CPT

## 2024-03-04 RX ORDER — FAMOTIDINE 20 MG/1
20 TABLET, FILM COATED ORAL 2 TIMES DAILY PRN
Qty: 60 TABLET | Refills: 2 | Status: SHIPPED | OUTPATIENT
Start: 2024-03-04

## 2024-03-04 NOTE — NURSING NOTE
Pt presents to clinic today for prenatal care 21w0d. Pt denies any contractions, bleeding, or leakage of fluid at this time. States baby is moving good.      Medication Reconciliation: complete  Shanta Karimi LPN

## 2024-03-04 NOTE — PROGRESS NOTES
Return OB Visit    S: Ms. Bhatti is feeling well today. She has no acute concerns. Denies leaking of fluid, vaginal bleeding, painful contractions. Notes fetal movements.    O: /60   Pulse 86   Wt 66 kg (145 lb 9.6 oz)   LMP 10/09/2023   BMI 24.61 kg/m    Gen: Well-appearing, NAD    Anatomy US today    Assessment:  Ms. Dania Bhatti is a 28 year old yo  here for an OB follow up visit. This pregnancy is complicated by prior .     Plan:  # Routine Prenatal Care  -- Dating: LMP=8 week US SANDIE: 7/15/2024  -- PNLs:                O+, Ab screen neg               RPR nr               Hep B S Ag neg               Hep C neg               Rubella immune               HIV neg     -- Genetic Screening: NIPT low risk XY  -- Anatomy US: today, 92%ile, normal anatomy  -- Immunizations: declined flu, Tdap at approximately 27 weeks gestation  -- 3rd TM labs including CBC, RPR and 1 hr GTT: Planned for after 28 weeks gestation  -- GBS: Planned for 36 weeks  -- Postpartum Planning: To be discussed   -- Delivery Planning: No indication for early IOL at this time. To be discussed continually  -- Return to clinic in 4 weeks for OB follow up visit  -- Planning to do at next visit: GTT     # History of   -- 41 weeks, 3400 grams due to fetal intolerance  -- Plans to do a repeat  at 39 weeks     # Easy bruising  -- Mom with history of bad thrombocytopenia in her pregnancies    Mercedez Saldivar MD  OB/GYN  3/4/2024 11:15 AM

## 2024-04-01 ENCOUNTER — PRENATAL OFFICE VISIT (OUTPATIENT)
Dept: OBGYN | Facility: OTHER | Age: 29
End: 2024-04-01
Attending: STUDENT IN AN ORGANIZED HEALTH CARE EDUCATION/TRAINING PROGRAM
Payer: COMMERCIAL

## 2024-04-01 VITALS
BODY MASS INDEX: 25.49 KG/M2 | HEART RATE: 112 BPM | DIASTOLIC BLOOD PRESSURE: 76 MMHG | SYSTOLIC BLOOD PRESSURE: 124 MMHG | WEIGHT: 150.8 LBS

## 2024-04-01 DIAGNOSIS — Z34.92 ENCOUNTER FOR PREGNANCY RELATED EXAMINATION IN SECOND TRIMESTER: Primary | ICD-10-CM

## 2024-04-01 DIAGNOSIS — Z34.92 SECOND TRIMESTER PREGNANCY: ICD-10-CM

## 2024-04-01 DIAGNOSIS — Z98.891 HISTORY OF CESAREAN SECTION: ICD-10-CM

## 2024-04-01 LAB
BASOPHILS # BLD AUTO: 0 10E3/UL (ref 0–0.2)
BASOPHILS NFR BLD AUTO: 0 %
EOSINOPHIL # BLD AUTO: 0.4 10E3/UL (ref 0–0.7)
EOSINOPHIL NFR BLD AUTO: 4 %
ERYTHROCYTE [DISTWIDTH] IN BLOOD BY AUTOMATED COUNT: 12.9 % (ref 10–15)
GLUCOSE 1H P 50 G GLC PO SERPL-MCNC: 94 MG/DL (ref 70–129)
HCT VFR BLD AUTO: 33.2 % (ref 35–47)
HGB BLD-MCNC: 11.1 G/DL (ref 11.7–15.7)
IMM GRANULOCYTES # BLD: 0.1 10E3/UL
IMM GRANULOCYTES NFR BLD: 1 %
LYMPHOCYTES # BLD AUTO: 2.3 10E3/UL (ref 0.8–5.3)
LYMPHOCYTES NFR BLD AUTO: 22 %
MCH RBC QN AUTO: 30.6 PG (ref 26.5–33)
MCHC RBC AUTO-ENTMCNC: 33.4 G/DL (ref 31.5–36.5)
MCV RBC AUTO: 92 FL (ref 78–100)
MONOCYTES # BLD AUTO: 0.9 10E3/UL (ref 0–1.3)
MONOCYTES NFR BLD AUTO: 8 %
NEUTROPHILS # BLD AUTO: 7 10E3/UL (ref 1.6–8.3)
NEUTROPHILS NFR BLD AUTO: 66 %
NRBC # BLD AUTO: 0 10E3/UL
NRBC BLD AUTO-RTO: 0 /100
PLATELET # BLD AUTO: 234 10E3/UL (ref 150–450)
RBC # BLD AUTO: 3.63 10E6/UL (ref 3.8–5.2)
T PALLIDUM AB SER QL: NONREACTIVE
WBC # BLD AUTO: 10.6 10E3/UL (ref 4–11)

## 2024-04-01 PROCEDURE — 36415 COLL VENOUS BLD VENIPUNCTURE: CPT | Mod: ZL | Performed by: STUDENT IN AN ORGANIZED HEALTH CARE EDUCATION/TRAINING PROGRAM

## 2024-04-01 PROCEDURE — 86780 TREPONEMA PALLIDUM: CPT | Mod: ZL | Performed by: STUDENT IN AN ORGANIZED HEALTH CARE EDUCATION/TRAINING PROGRAM

## 2024-04-01 PROCEDURE — 99207 PR OB VISIT-NO CHARGE - GICH ONLY: CPT | Performed by: STUDENT IN AN ORGANIZED HEALTH CARE EDUCATION/TRAINING PROGRAM

## 2024-04-01 PROCEDURE — 85004 AUTOMATED DIFF WBC COUNT: CPT | Mod: ZL | Performed by: STUDENT IN AN ORGANIZED HEALTH CARE EDUCATION/TRAINING PROGRAM

## 2024-04-01 PROCEDURE — 82950 GLUCOSE TEST: CPT | Mod: ZL | Performed by: STUDENT IN AN ORGANIZED HEALTH CARE EDUCATION/TRAINING PROGRAM

## 2024-04-01 NOTE — PROGRESS NOTES
Return OB Visit    S: Ms. Bhatti is feeling well today. She has no acute concerns. Denies leaking of fluid, vaginal bleeding, painful contractions. Notes fetal movements.    O: /76   Pulse 112   Wt 68.4 kg (150 lb 12.8 oz)   LMP 10/09/2023   BMI 25.49 kg/m    Gen: Well-appearing, NAD    FH 25 cm   bpm    Assessment:  Ms. Dania Bhatti is a 28 year old yo  here for an OB follow up visit. This pregnancy is complicated by prior .     Plan:  # Routine Prenatal Care  -- Dating: LMP=8 week US SANDIE: 7/15/2024  -- PNLs:                O+, Ab screen neg               RPR nr               Hep B S Ag neg               Hep C neg               Rubella immune               HIV neg     -- Genetic Screening: NIPT low risk XY  -- Anatomy US: today, 92%ile, normal anatomy  -- Immunizations: declined flu, Tdap at approximately 27 weeks gestation  -- 3rd TM labs including CBC, RPR and 1 hr GTT: today  -- GBS: Planned for 36 weeks  -- Postpartum Planning: To be discussed   -- Delivery Planning: No indication for early IOL at this time. To be discussed continually  -- Return to clinic in 4 weeks for OB follow up visit  -- Planning to do at next visit: Tdap     # History of   -- 41 weeks, 3400 grams due to fetal intolerance  -- Plans to do a repeat  at 39 weeks     # Easy bruising  -- Mom with history of bad thrombocytopenia in her pregnancies    Mercedez Saldivar MD  OB/GYN  2024 10:43 AM

## 2024-04-29 ENCOUNTER — PRENATAL OFFICE VISIT (OUTPATIENT)
Dept: OBGYN | Facility: OTHER | Age: 29
End: 2024-04-29
Attending: STUDENT IN AN ORGANIZED HEALTH CARE EDUCATION/TRAINING PROGRAM
Payer: COMMERCIAL

## 2024-04-29 VITALS
DIASTOLIC BLOOD PRESSURE: 68 MMHG | HEART RATE: 86 BPM | WEIGHT: 153.6 LBS | SYSTOLIC BLOOD PRESSURE: 118 MMHG | BODY MASS INDEX: 25.96 KG/M2

## 2024-04-29 DIAGNOSIS — Z98.891 HISTORY OF CESAREAN SECTION: Primary | ICD-10-CM

## 2024-04-29 PROCEDURE — 99207 PR OB VISIT-NO CHARGE - GICH ONLY: CPT | Performed by: STUDENT IN AN ORGANIZED HEALTH CARE EDUCATION/TRAINING PROGRAM

## 2024-04-29 NOTE — PROGRESS NOTES
Return OB Visit    S: Ms. Bhatit is feeling well today. She has no acute concerns. Denies leaking of fluid, vaginal bleeding, painful contractions. Notes fetal movements.    O: /68   Pulse 86   Wt 69.7 kg (153 lb 9.6 oz)   LMP 10/09/2023   BMI 25.96 kg/m    Gen: Well-appearing, NAD    FH 30 cm   bpm    Assessment:  Ms. Dania Bhatti is a 28 year old yo  here for an OB follow up visit. This pregnancy is complicated by prior .     Plan:  # Routine Prenatal Care  -- Dating: LMP=8 week US SANDIE: 7/15/2024  -- PNLs:                O+, Ab screen neg               RPR nr               Hep B S Ag neg               Hep C neg               Rubella immune               HIV neg     -- Genetic Screening: NIPT low risk XY  -- Anatomy US: today, 92%ile, normal anatomy  -- Immunizations: declined flu, declined Tdap, Declined RSV  -- 3rd TM labs including CBC, RPR: RPR nr, Hgb 11.1, Plt 234  -- 1 hr GTT: 94  -- GBS: Planned for 36 weeks  -- Postpartum Planning: breastfeeding (interested in meeting with Shari), declines contraception  -- Delivery Planning:  Desires RCS at 39 weeks:  or   -- Return to clinic in 2 weeks for OB follow up visit  -- Planning to do at next visit: Schedule RCS at 39  weeks     # History of   -- 41 weeks, 3400 grams due to fetal intolerance  -- Plans to do a repeat  at 39 weeks     # Easy bruising  -- Mom with history of bad thrombocytopenia in her pregnancies    Mercedez Saldivar MD  OB/GYN  2024 10:41 AM

## 2024-04-29 NOTE — NURSING NOTE
Pt presents to clinic today for prenatal care 29w0d. Pt denies any contractions, bleeding, or leakage of fluid at this time. States baby is moving good.      Medication Reconciliation: complete  Shanta Karimi LPN

## 2024-05-13 ENCOUNTER — PRENATAL OFFICE VISIT (OUTPATIENT)
Dept: OBGYN | Facility: OTHER | Age: 29
End: 2024-05-13
Attending: STUDENT IN AN ORGANIZED HEALTH CARE EDUCATION/TRAINING PROGRAM
Payer: COMMERCIAL

## 2024-05-13 VITALS
SYSTOLIC BLOOD PRESSURE: 122 MMHG | HEART RATE: 88 BPM | DIASTOLIC BLOOD PRESSURE: 60 MMHG | BODY MASS INDEX: 26.03 KG/M2 | WEIGHT: 154 LBS

## 2024-05-13 DIAGNOSIS — Z98.891 HISTORY OF CESAREAN SECTION: Primary | ICD-10-CM

## 2024-05-13 PROCEDURE — 99207 PR OB VISIT-NO CHARGE - GICH ONLY: CPT

## 2024-05-13 RX ORDER — ACETAMINOPHEN 325 MG/1
975 TABLET ORAL ONCE
Status: CANCELLED | OUTPATIENT
Start: 2024-05-13 | End: 2024-05-13

## 2024-05-13 RX ORDER — OXYTOCIN 10 [USP'U]/ML
10 INJECTION, SOLUTION INTRAMUSCULAR; INTRAVENOUS
Status: CANCELLED | OUTPATIENT
Start: 2024-05-13

## 2024-05-13 RX ORDER — OXYTOCIN/0.9 % SODIUM CHLORIDE 30/500 ML
100-340 PLASTIC BAG, INJECTION (ML) INTRAVENOUS CONTINUOUS PRN
Status: CANCELLED | OUTPATIENT
Start: 2024-05-13

## 2024-05-13 RX ORDER — LOPERAMIDE HCL 2 MG
2 CAPSULE ORAL
Status: CANCELLED | OUTPATIENT
Start: 2024-05-13

## 2024-05-13 RX ORDER — CARBOPROST TROMETHAMINE 250 UG/ML
250 INJECTION, SOLUTION INTRAMUSCULAR
Status: CANCELLED | OUTPATIENT
Start: 2024-05-13

## 2024-05-13 RX ORDER — SODIUM CHLORIDE, SODIUM LACTATE, POTASSIUM CHLORIDE, CALCIUM CHLORIDE 600; 310; 30; 20 MG/100ML; MG/100ML; MG/100ML; MG/100ML
INJECTION, SOLUTION INTRAVENOUS CONTINUOUS
Status: CANCELLED | OUTPATIENT
Start: 2024-05-13

## 2024-05-13 RX ORDER — METHYLERGONOVINE MALEATE 0.2 MG/ML
200 INJECTION INTRAVENOUS
Status: CANCELLED | OUTPATIENT
Start: 2024-05-13

## 2024-05-13 RX ORDER — OXYTOCIN/0.9 % SODIUM CHLORIDE 30/500 ML
340 PLASTIC BAG, INJECTION (ML) INTRAVENOUS CONTINUOUS PRN
Status: CANCELLED | OUTPATIENT
Start: 2024-05-13

## 2024-05-13 RX ORDER — TRANEXAMIC ACID 10 MG/ML
1 INJECTION, SOLUTION INTRAVENOUS EVERY 30 MIN PRN
Status: CANCELLED | OUTPATIENT
Start: 2024-05-13

## 2024-05-13 RX ORDER — MISOPROSTOL 100 UG/1
400 TABLET ORAL
Status: CANCELLED | OUTPATIENT
Start: 2024-05-13

## 2024-05-13 RX ORDER — CITRIC ACID/SODIUM CITRATE 334-500MG
30 SOLUTION, ORAL ORAL
Status: CANCELLED | OUTPATIENT
Start: 2024-05-13

## 2024-05-13 RX ORDER — LIDOCAINE 40 MG/G
CREAM TOPICAL
Status: CANCELLED | OUTPATIENT
Start: 2024-05-13

## 2024-05-13 RX ORDER — LOPERAMIDE HCL 2 MG
4 CAPSULE ORAL
Status: CANCELLED | OUTPATIENT
Start: 2024-05-13

## 2024-05-13 NOTE — PROGRESS NOTES
OB Visit    S: Patient is feeling well. Denies LOF, VB, or regular Ctx. + FM.    Concerns:     O: /60   Pulse 88   Wt 69.9 kg (154 lb)   LMP 10/09/2023   BMI 26.03 kg/m    Gen: Well-appearing, NAD  FH: 32  FHT: 133      A/P:  Dania Bhatti is a 28 year old  at 31w0d by LMP CW 8 week US, here for return OB visit.   History of   -- 41 weeks, 3400 grams due to fetal intolerance  -- Plans to do a repeat  at 39 weeks     # Easy bruising  -- Mom with history of bad thrombocytopenia in her pregnancies    PNC: We discussed the below recommendations for planning, testing, and add on options as well as detailed any increased risk based on patient history. The subsequent choices and results if any are reflected below.   Prenatal labs WNL, Rh +, Rubella immune, GCT94 , 3rd Trimester HGB 11.1, GBS TBD   Rhogam: NA  Genetics: low risk XY, Low risk carrier   Imaging: normal anatomy   Immunizations: TDAP declined, RSV declines  Delivery Plan: RCS on 24   BC after delivery: declines     RTC 2 weeks       Peggy TERRAZAS, ELPIDIOP-C  2024 10:03 AM

## 2024-05-13 NOTE — NURSING NOTE
Chief Complaint   Patient presents with    Prenatal Care     31w0d       Medication Reconciliation: complete    Pt presents to clinic today for prenatal care 31w0d. Pt denies any bleeding, or leakage of fluid at this time. States baby is moving good. Patient denies contractions.       Lynne Sage LPN

## 2024-05-29 ENCOUNTER — ORDERS ONLY (AUTO-RELEASED) (OUTPATIENT)
Dept: OBGYN | Facility: OTHER | Age: 29
End: 2024-05-29
Payer: COMMERCIAL

## 2024-05-29 ENCOUNTER — PRENATAL OFFICE VISIT (OUTPATIENT)
Dept: OBGYN | Facility: OTHER | Age: 29
End: 2024-05-29
Attending: STUDENT IN AN ORGANIZED HEALTH CARE EDUCATION/TRAINING PROGRAM
Payer: COMMERCIAL

## 2024-05-29 VITALS
DIASTOLIC BLOOD PRESSURE: 80 MMHG | WEIGHT: 152 LBS | BODY MASS INDEX: 25.69 KG/M2 | SYSTOLIC BLOOD PRESSURE: 132 MMHG | HEART RATE: 88 BPM

## 2024-05-29 DIAGNOSIS — Z34.93 ENCOUNTER FOR PREGNANCY RELATED EXAMINATION IN THIRD TRIMESTER: Primary | ICD-10-CM

## 2024-05-29 DIAGNOSIS — R00.2 HEART PALPITATIONS: ICD-10-CM

## 2024-05-29 LAB
ALBUMIN SERPL BCG-MCNC: 3.8 G/DL (ref 3.5–5.2)
ALP SERPL-CCNC: 90 U/L (ref 40–150)
ALT SERPL W P-5'-P-CCNC: 8 U/L (ref 0–50)
ANION GAP SERPL CALCULATED.3IONS-SCNC: 11 MMOL/L (ref 7–15)
AST SERPL W P-5'-P-CCNC: 14 U/L (ref 0–45)
BILIRUB SERPL-MCNC: 0.2 MG/DL
BUN SERPL-MCNC: 7.1 MG/DL (ref 6–20)
CALCIUM SERPL-MCNC: 9 MG/DL (ref 8.6–10)
CHLORIDE SERPL-SCNC: 102 MMOL/L (ref 98–107)
CREAT SERPL-MCNC: 0.53 MG/DL (ref 0.51–0.95)
DEPRECATED HCO3 PLAS-SCNC: 23 MMOL/L (ref 22–29)
EGFRCR SERPLBLD CKD-EPI 2021: >90 ML/MIN/1.73M2
ERYTHROCYTE [DISTWIDTH] IN BLOOD BY AUTOMATED COUNT: 12.3 % (ref 10–15)
GLUCOSE SERPL-MCNC: 93 MG/DL (ref 70–99)
HCT VFR BLD AUTO: 36.2 % (ref 35–47)
HGB BLD-MCNC: 12.2 G/DL (ref 11.7–15.7)
MCH RBC QN AUTO: 30.7 PG (ref 26.5–33)
MCHC RBC AUTO-ENTMCNC: 33.7 G/DL (ref 31.5–36.5)
MCV RBC AUTO: 91 FL (ref 78–100)
PLATELET # BLD AUTO: 197 10E3/UL (ref 150–450)
POTASSIUM SERPL-SCNC: 3.8 MMOL/L (ref 3.4–5.3)
PROT SERPL-MCNC: 7 G/DL (ref 6.4–8.3)
RBC # BLD AUTO: 3.98 10E6/UL (ref 3.8–5.2)
SODIUM SERPL-SCNC: 136 MMOL/L (ref 135–145)
WBC # BLD AUTO: 9.3 10E3/UL (ref 4–11)

## 2024-05-29 PROCEDURE — 99207 PR OB VISIT-NO CHARGE - GICH ONLY: CPT

## 2024-05-29 PROCEDURE — 84450 TRANSFERASE (AST) (SGOT): CPT | Mod: ZL

## 2024-05-29 PROCEDURE — 82040 ASSAY OF SERUM ALBUMIN: CPT | Mod: ZL

## 2024-05-29 PROCEDURE — 85027 COMPLETE CBC AUTOMATED: CPT | Mod: ZL

## 2024-05-29 PROCEDURE — 36415 COLL VENOUS BLD VENIPUNCTURE: CPT | Mod: ZL

## 2024-05-29 NOTE — PROGRESS NOTES
"Date of Surgery: 2024  Type of Surgery:   Surgeon: Dr. Grijalva      Patient was given surgical folder  which includes pre-operative bathing instructions related to the two packets of Hibiclens surgical prep provided. appropriate appointments were scheduled by the Unit [facility] .. Surgical forms were copied and kept for informative purposes. Originals were delivered to Day-surgery. Questions were answered to the best of this nurse's ability.        STOP BANG    Fever/Chills or other infectious symptoms in past month? no  >10 pound weight loss in the past 2 months? no  Health Care Directive on file? no  History of blood transfusions? no  Td up to date? yes  History of VRE/MRSA? no      Obstructive Sleep Apnea screening    Preoperative Evaluation: Obstructive Sleep Apnea screening    S: Snore -  Do you snore loudly? (louder than talking or loud enough to be heard through closed doors) No  T: Tired - Do you often feel tired, fatigued, or sleepy during the daytime?No  O: Observed - Has anyone ever observed you stop breathing during your sleep?No  P: Pressure - Do you have or are you being treated for high blood pressure?No  B: BMI - BMI greater than 35kg/m2?No  A: Age - Age over 50 years old?No  N: Neck - Neck circumference greater than 40 cm?No  G: Gender - Gender: Male?No    Total number of \"YES\" responses:  0    Scoring: Low risk of STAR 0-2  At Risk of STAR: >3 High Risk of STAR: 5-8      Total yes answers in STAR section:    Low risk 0-2  At risk 3-4  High risk 5-8    Gianna Kiser RN............. 2024 11:04 AM    "

## 2024-05-29 NOTE — NURSING NOTE
Chief Complaint   Patient presents with    Prenatal Care     33w2d       Medication Reconciliation: complete    Pt presents to clinic today for prenatal care 33w2d. Pt denies any bleeding, or leakage of fluid at this time. States baby is moving good. Patient denies contractions.       Lynne Sage LPN

## 2024-05-29 NOTE — PROGRESS NOTES
OB Visit    S: Patient is feeling okay having more back and right lower sided pain- gets worse with exercise. No signs of UTI or vaginal infection- no contractions. Denies LOF, VB, or regular Ctx. - FM.    Concerns: heart palpitations when she lays down and causes SOB some times. No chest pain.     O: /80   Pulse 88   Wt 68.9 kg (152 lb)   LMP 10/09/2023   BMI 25.69 kg/m    Gen: Well-appearing, NAD  FH: 33.5  FHT: 158      A/P:  Dania Bhatti is a 28 year old  at 33w2d  by LMP CW 8 week US, here for return OB visit.   History of   -- 41 weeks, 3400 grams due to fetal intolerance  -- Plans to do a repeat  at 39 weeks- scheduled for      # Easy bruising  -- Mom with history of bad thrombocytopenia in her pregnancies    #heart palpitations- discussed this can be normal in pregnancy but that we should complete work up for pt. CMP, CBC and zio today- strict return precautions given.      PNC: We discussed the below recommendations for planning, testing, and add on options as well as detailed any increased risk based on patient history. The subsequent choices and results if any are reflected below.   Prenatal labs WNL, Rh +, Rubella immune, GCT94 , 3rd Trimester HGB 11.1, GBS TBD   Rhogam: NA  Genetics: low risk XY, Low risk carrier   Imaging: normal anatomy   Immunizations: TDAP declined, RSV declines  Delivery Plan: RCS on 24   BC after delivery: declines      RTC 2 weeks       Peggy TERRAZAS, FNP-C  2024 11:18 AM

## 2024-06-10 ENCOUNTER — MYC MEDICAL ADVICE (OUTPATIENT)
Dept: OBGYN | Facility: OTHER | Age: 29
End: 2024-06-10

## 2024-06-10 ENCOUNTER — PRENATAL OFFICE VISIT (OUTPATIENT)
Dept: OBGYN | Facility: OTHER | Age: 29
End: 2024-06-10
Attending: STUDENT IN AN ORGANIZED HEALTH CARE EDUCATION/TRAINING PROGRAM
Payer: COMMERCIAL

## 2024-06-10 VITALS
DIASTOLIC BLOOD PRESSURE: 72 MMHG | WEIGHT: 161 LBS | HEART RATE: 102 BPM | SYSTOLIC BLOOD PRESSURE: 126 MMHG | BODY MASS INDEX: 27.21 KG/M2

## 2024-06-10 DIAGNOSIS — Z98.891 HISTORY OF CESAREAN SECTION: Primary | ICD-10-CM

## 2024-06-10 PROCEDURE — 99207 PR OB VISIT-NO CHARGE - GICH ONLY: CPT | Performed by: STUDENT IN AN ORGANIZED HEALTH CARE EDUCATION/TRAINING PROGRAM

## 2024-06-10 NOTE — PROGRESS NOTES
Return OB Visit    S: Ms. Bhatti is feeling well today. She has no acute concerns. Denies leaking of fluid, vaginal bleeding, painful contractions. Notes fetal movements.    O: /72   Pulse 102   Wt 73 kg (161 lb)   LMP 10/09/2023   BMI 27.21 kg/m    Gen: Well-appearing, NAD    FH 35 cm   bpm    Assessment:  Ms. Dania Bhatti is a 28 year old yo  here for an OB follow up visit. This pregnancy is complicated by prior .     Plan:  # Routine Prenatal Care  -- Dating: LMP=8 week US SANDIE: 7/15/2024  -- PNLs:                O+, Ab screen neg               RPR nr               Hep B S Ag neg               Hep C neg               Rubella immune               HIV neg     -- Genetic Screening: NIPT low risk XY  -- Anatomy US: today, 92%ile, normal anatomy  -- Immunizations: declined flu, declined Tdap, Declined RSV  -- 3rd TM labs including CBC, RPR: RPR nr, Hgb 11.1, Plt 234  -- 1 hr GTT: 94  -- GBS: Planned for 36 weeks  -- Postpartum Planning: breastfeeding (interested in meeting with Shari), declines contraception  -- Delivery Planning:  Desires RCS at 39 weeks:    -- Return to clinic in 1 weeks for OB follow up visit  -- Planning to do at next visit: GBS     # History of   -- 41 weeks, 3400 grams due to fetal intolerance  -- Plans to do a repeat  at 39 weeks     # Easy bruising  -- Mom with history of bad thrombocytopenia in her pregnancies. Normal platelets herself, 197 at third trimester labs       Mercedez Saldivar MD  OB/GYN  6/10/2024 9:19 AM

## 2024-06-10 NOTE — TELEPHONE ENCOUNTER
Routing to Dr. Mercedez Saldivar to advise.     Pt is wondering if she should stop her prenatal vitamin at the 36 week tree due to it having DHA and Omega-3 and being scheduled for a .     Gianna Kiser RN on 6/10/2024 at 1:38 PM

## 2024-06-10 NOTE — NURSING NOTE
Pt presents to clinic today for prenatal care 35w0d. Pt denies any bleeding, or leakage of fluid at this time. States baby is moving good and has noticed pelvic cramping and pressure.      Medication Reconciliation: complete  Shanta Karimi LPN

## 2024-06-17 ENCOUNTER — PRENATAL OFFICE VISIT (OUTPATIENT)
Dept: OBGYN | Facility: OTHER | Age: 29
End: 2024-06-17
Attending: STUDENT IN AN ORGANIZED HEALTH CARE EDUCATION/TRAINING PROGRAM
Payer: COMMERCIAL

## 2024-06-17 VITALS
HEART RATE: 88 BPM | SYSTOLIC BLOOD PRESSURE: 110 MMHG | DIASTOLIC BLOOD PRESSURE: 66 MMHG | WEIGHT: 158.2 LBS | BODY MASS INDEX: 26.74 KG/M2

## 2024-06-17 DIAGNOSIS — Z98.891 HISTORY OF CESAREAN SECTION: ICD-10-CM

## 2024-06-17 DIAGNOSIS — Z34.93 ENCOUNTER FOR PREGNANCY RELATED EXAMINATION IN THIRD TRIMESTER: Primary | ICD-10-CM

## 2024-06-17 PROCEDURE — 87653 STREP B DNA AMP PROBE: CPT | Mod: ZL | Performed by: STUDENT IN AN ORGANIZED HEALTH CARE EDUCATION/TRAINING PROGRAM

## 2024-06-17 PROCEDURE — 99207 PR OB VISIT-NO CHARGE - GICH ONLY: CPT | Performed by: STUDENT IN AN ORGANIZED HEALTH CARE EDUCATION/TRAINING PROGRAM

## 2024-06-17 NOTE — NURSING NOTE
Pt presents to clinic today for prenatal care 36w0d. Pt denies any bleeding, or leakage of fluid at this time. States baby is moving good and has noticed the intermitting contractions that are lasting 30 seconds- 1 min when she does get them.        Medication Reconciliation: complete  Shanta Karimi LPN

## 2024-06-17 NOTE — PROGRESS NOTES
Return OB Visit    S: Ms. Bhatti is feeling well today. She has no acute concerns. Denies leaking of fluid, vaginal bleeding, painful contractions. She does get pelvic pressure intermittently throughout the day and has some contractions that are not consistent. Notes fetal movements.    O: /66   Pulse 88   Wt 71.8 kg (158 lb 3.2 oz)   LMP 10/09/2023   BMI 26.74 kg/m    Gen: Well-appearing, NAD    FH 36 cm  FHT 150s bpm    Assessment:  Ms. Dania Bhatti is a 28 year old yo  here for an OB follow up visit. This pregnancy is complicated by prior .     Plan:  # Routine Prenatal Care  -- Dating: LMP=8 week US SANDIE: 7/15/2024  -- PNLs:                O+, Ab screen neg               RPR nr               Hep B S Ag neg               Hep C neg               Rubella immune               HIV neg     -- Genetic Screening: NIPT low risk XY  -- Anatomy US: today, 92%ile, normal anatomy  -- Immunizations: declined flu, declined Tdap, Declined RSV  -- 3rd TM labs including CBC, RPR: RPR nr, Hgb 11.1, Plt 234  -- 1 hr GTT: 94  -- GBS: today  -- Postpartum Planning: breastfeeding (interested in meeting with Shari), declines contraception  -- Delivery Planning:  Desires RCS at 39 weeks:    -- Return to clinic in 1 weeks for OB follow up visit  -- Planning to do at next visit: confirm delivery planning: scheduled      # History of   -- 41 weeks, 3400 grams due to fetal intolerance  -- Plans to do a repeat  at 39 weeks     # Easy bruising  -- Mom with history of bad thrombocytopenia in her pregnancies. Normal platelets herself, 197 at third trimester labs    Mercedez Saldivar MD  OB/GYN  2024 10:49 AM

## 2024-06-18 LAB — GP B STREP DNA SPEC QL NAA+PROBE: NEGATIVE

## 2024-06-21 PROCEDURE — 93248 EXT ECG>7D<15D REV&INTERPJ: CPT | Performed by: INTERNAL MEDICINE

## 2024-06-25 ENCOUNTER — PRENATAL OFFICE VISIT (OUTPATIENT)
Dept: OBGYN | Facility: OTHER | Age: 29
End: 2024-06-25
Attending: STUDENT IN AN ORGANIZED HEALTH CARE EDUCATION/TRAINING PROGRAM
Payer: COMMERCIAL

## 2024-06-25 VITALS
SYSTOLIC BLOOD PRESSURE: 118 MMHG | DIASTOLIC BLOOD PRESSURE: 70 MMHG | HEART RATE: 88 BPM | BODY MASS INDEX: 26.87 KG/M2 | WEIGHT: 159 LBS

## 2024-06-25 DIAGNOSIS — Z34.93 ENCOUNTER FOR PREGNANCY RELATED EXAMINATION IN THIRD TRIMESTER: Primary | ICD-10-CM

## 2024-06-25 PROCEDURE — 99207 PR OB VISIT-NO CHARGE - GICH ONLY: CPT | Performed by: OBSTETRICS & GYNECOLOGY

## 2024-06-25 ASSESSMENT — PAIN SCALES - GENERAL: PAINLEVEL: NO PAIN (0)

## 2024-06-25 NOTE — NURSING NOTE
Chief Complaint   Patient presents with    Prenatal Care     37w1d       Medication Reconciliation: complete    Pt presents to clinic today for prenatal care. Pt denies any bleeding, or leakage of fluid at this time. States baby is moving good. Patient denies any regular contractions.       Lea Kruger LPN........................6/25/2024  12:43 PM

## 2024-06-25 NOTE — PROGRESS NOTES
Return OB Visit    S: More BH ctx, especially at night. No VB or LOF. +FM    O: /70 (BP Location: Right arm, Patient Position: Sitting, Cuff Size: Adult Regular)   Pulse 88   Wt 72.1 kg (159 lb)   LMP 10/09/2023   BMI 26.87 kg/m    Gen: Well-appearing, NAD  See OB Flowsheet    A/P:  Dania Bhatti is a 28 year old  at 37w1d by LMP c/w 8w6d US, here for return OB visit.  Hx of c/s: repeat scheduled for   Plans breastfeeding, declines contraception    PNC: Rh positive, Rubella immune, GCT 94, GBS negative  Genetics: low-risk aneuploidy screening  Imaging: dating US at 8w6d, normal anatomy  Immunizations: declined Tdap  RTC weekly until delivery     Daily William MD FACOG  OB/GYN  2024 12:49 PM

## 2024-07-01 ENCOUNTER — PRENATAL OFFICE VISIT (OUTPATIENT)
Dept: OBGYN | Facility: OTHER | Age: 29
End: 2024-07-01
Attending: STUDENT IN AN ORGANIZED HEALTH CARE EDUCATION/TRAINING PROGRAM
Payer: COMMERCIAL

## 2024-07-01 VITALS
BODY MASS INDEX: 27.04 KG/M2 | SYSTOLIC BLOOD PRESSURE: 120 MMHG | WEIGHT: 160 LBS | HEART RATE: 91 BPM | DIASTOLIC BLOOD PRESSURE: 72 MMHG

## 2024-07-01 DIAGNOSIS — Z34.93 ENCOUNTER FOR PREGNANCY RELATED EXAMINATION IN THIRD TRIMESTER: Primary | ICD-10-CM

## 2024-07-01 PROCEDURE — 99207 PR OB VISIT-NO CHARGE - GICH ONLY: CPT

## 2024-07-01 ASSESSMENT — PAIN SCALES - GENERAL: PAINLEVEL: NO PAIN (0)

## 2024-07-01 NOTE — NURSING NOTE
"Chief Complaint   Patient presents with    Prenatal Care     38 w     Pt presents to clinic today for prenatal care 38 w . Pt denies any bleeding, or leakage of fluid at this time. States baby is moving good. Patient denies contractions.    Initial /72   Pulse 91   Wt 72.6 kg (160 lb)   LMP 10/09/2023   BMI 27.04 kg/m   Estimated body mass index is 27.04 kg/m  as calculated from the following:    Height as of 1/8/24: 1.638 m (5' 4.5\").    Weight as of this encounter: 72.6 kg (160 lb).  Medication Reconciliation: complete      Myesha Zamora, SERENITY    "

## 2024-07-01 NOTE — PROGRESS NOTES
OB Visit    S: Patient is feeling well. Denies LOF, VB, or regular Ctx. + FM.    Concerns: none    O: /72   Pulse 91   Wt 72.6 kg (160 lb)   LMP 10/09/2023   BMI 27.04 kg/m    Gen: Well-appearing, NAD  FH: 39  FHT: 148  Cx: ft       A/P:  Dania Bhatti is a 28 year old  at 38w0d by LMP CW 8 week US, here for return OB visit.   History of   -- 41 weeks, 3400 grams due to fetal intolerance  -- Plans to do a repeat  at 39 weeks     # Easy bruising  -- Mom with history of bad thrombocytopenia in her pregnancies     PNC: We discussed the below recommendations for planning, testing, and add on options as well as detailed any increased risk based on patient history. The subsequent choices and results if any are reflected below.   Prenatal labs WNL, Rh +, Rubella immune, GCT94 , 3rd Trimester HGB 11.1, GBS negative   Rhogam: NA  Genetics: low risk XY, Low risk carrier   Imaging: normal anatomy   Immunizations: TDAP declined, RSV declines  Delivery Plan: RCS on 24   BC after delivery: declines   Delivery Hx: c section      RTC for c section     HAMZAH Hart  2024 10:03 AM

## 2024-07-03 ENCOUNTER — ANESTHESIA EVENT (OUTPATIENT)
Dept: SURGERY | Facility: OTHER | Age: 29
End: 2024-07-03
Payer: COMMERCIAL

## 2024-07-03 RX ORDER — LIDOCAINE 40 MG/G
CREAM TOPICAL
Status: CANCELLED | OUTPATIENT
Start: 2024-07-03

## 2024-07-03 RX ORDER — SODIUM CHLORIDE, SODIUM LACTATE, POTASSIUM CHLORIDE, CALCIUM CHLORIDE 600; 310; 30; 20 MG/100ML; MG/100ML; MG/100ML; MG/100ML
INJECTION, SOLUTION INTRAVENOUS CONTINUOUS
Status: CANCELLED | OUTPATIENT
Start: 2024-07-03

## 2024-07-04 ENCOUNTER — HOSPITAL ENCOUNTER (INPATIENT)
Facility: OTHER | Age: 29
LOS: 3 days | Discharge: HOME | End: 2024-07-07
Attending: OBSTETRICS & GYNECOLOGY | Admitting: OBSTETRICS & GYNECOLOGY
Payer: COMMERCIAL

## 2024-07-04 DIAGNOSIS — Z37.9 NORMAL LABOR: ICD-10-CM

## 2024-07-04 DIAGNOSIS — Z98.891 S/P CESAREAN SECTION: Primary | ICD-10-CM

## 2024-07-04 DIAGNOSIS — Z98.891 HISTORY OF CESAREAN SECTION: ICD-10-CM

## 2024-07-04 PROBLEM — Z36.89 ENCOUNTER FOR TRIAGE IN PREGNANT PATIENT: Status: ACTIVE | Noted: 2021-07-17

## 2024-07-04 LAB
ABO/RH(D): NORMAL
ANTIBODY SCREEN: NEGATIVE
ERYTHROCYTE [DISTWIDTH] IN BLOOD BY AUTOMATED COUNT: 12.5 % (ref 10–15)
FASTING STATUS PATIENT QL REPORTED: NORMAL
GLUCOSE SERPL-MCNC: 91 MG/DL (ref 70–99)
HCT VFR BLD AUTO: 35.3 % (ref 35–47)
HGB BLD-MCNC: 12.1 G/DL (ref 11.7–15.7)
MCH RBC QN AUTO: 30.8 PG (ref 26.5–33)
MCHC RBC AUTO-ENTMCNC: 34.3 G/DL (ref 31.5–36.5)
MCV RBC AUTO: 90 FL (ref 78–100)
PLATELET # BLD AUTO: 207 10E3/UL (ref 150–450)
RBC # BLD AUTO: 3.93 10E6/UL (ref 3.8–5.2)
RUPTURE OF FETAL MEMBRANES BY ROM PLUS: POSITIVE
SPECIMEN EXPIRATION DATE: NORMAL
WBC # BLD AUTO: 10.7 10E3/UL (ref 4–11)

## 2024-07-04 PROCEDURE — C9290 INJ, BUPIVACAINE LIPOSOME: HCPCS

## 2024-07-04 PROCEDURE — 272N000001 HC OR GENERAL SUPPLY STERILE: Performed by: OBSTETRICS & GYNECOLOGY

## 2024-07-04 PROCEDURE — 360N000076 HC SURGERY LEVEL 3, PER MIN: Performed by: OBSTETRICS & GYNECOLOGY

## 2024-07-04 PROCEDURE — 82947 ASSAY GLUCOSE BLOOD QUANT: CPT | Performed by: OBSTETRICS & GYNECOLOGY

## 2024-07-04 PROCEDURE — 250N000011 HC RX IP 250 OP 636

## 2024-07-04 PROCEDURE — 258N000003 HC RX IP 258 OP 636: Performed by: OBSTETRICS & GYNECOLOGY

## 2024-07-04 PROCEDURE — 258N000003 HC RX IP 258 OP 636

## 2024-07-04 PROCEDURE — 86780 TREPONEMA PALLIDUM: CPT | Performed by: OBSTETRICS & GYNECOLOGY

## 2024-07-04 PROCEDURE — 84112 EVAL AMNIOTIC FLUID PROTEIN: CPT | Performed by: OBSTETRICS & GYNECOLOGY

## 2024-07-04 PROCEDURE — 59510 CESAREAN DELIVERY: CPT

## 2024-07-04 PROCEDURE — 370N000017 HC ANESTHESIA TECHNICAL FEE, PER MIN: Performed by: OBSTETRICS & GYNECOLOGY

## 2024-07-04 PROCEDURE — 250N000011 HC RX IP 250 OP 636: Performed by: OBSTETRICS & GYNECOLOGY

## 2024-07-04 PROCEDURE — 120N000001 HC R&B MED SURG/OB

## 2024-07-04 PROCEDURE — 85027 COMPLETE CBC AUTOMATED: CPT | Performed by: OBSTETRICS & GYNECOLOGY

## 2024-07-04 PROCEDURE — 710N000010 HC RECOVERY PHASE 1, LEVEL 2, PER MIN: Performed by: OBSTETRICS & GYNECOLOGY

## 2024-07-04 PROCEDURE — 36415 COLL VENOUS BLD VENIPUNCTURE: CPT | Performed by: OBSTETRICS & GYNECOLOGY

## 2024-07-04 PROCEDURE — 86900 BLOOD TYPING SEROLOGIC ABO: CPT | Performed by: OBSTETRICS & GYNECOLOGY

## 2024-07-04 PROCEDURE — 59510 CESAREAN DELIVERY: CPT | Performed by: OBSTETRICS & GYNECOLOGY

## 2024-07-04 PROCEDURE — 250N000009 HC RX 250

## 2024-07-04 PROCEDURE — 64488 TAP BLOCK BI INJECTION: CPT

## 2024-07-04 PROCEDURE — 250N000013 HC RX MED GY IP 250 OP 250 PS 637: Performed by: OBSTETRICS & GYNECOLOGY

## 2024-07-04 PROCEDURE — 250N000013 HC RX MED GY IP 250 OP 250 PS 637

## 2024-07-04 RX ORDER — LOPERAMIDE HCL 2 MG
2 CAPSULE ORAL
Status: DISCONTINUED | OUTPATIENT
Start: 2024-07-04 | End: 2024-07-07 | Stop reason: HOSPADM

## 2024-07-04 RX ORDER — METHYLERGONOVINE MALEATE 0.2 MG/ML
200 INJECTION INTRAVENOUS
Status: DISCONTINUED | OUTPATIENT
Start: 2024-07-04 | End: 2024-07-07 | Stop reason: HOSPADM

## 2024-07-04 RX ORDER — CEFAZOLIN SODIUM/WATER 2 G/20 ML
2 SYRINGE (ML) INTRAVENOUS
Status: DISCONTINUED | OUTPATIENT
Start: 2024-07-04 | End: 2024-07-04 | Stop reason: HOSPADM

## 2024-07-04 RX ORDER — OXYTOCIN/0.9 % SODIUM CHLORIDE 30/500 ML
100-340 PLASTIC BAG, INJECTION (ML) INTRAVENOUS CONTINUOUS PRN
Status: DISCONTINUED | OUTPATIENT
Start: 2024-07-04 | End: 2024-07-04

## 2024-07-04 RX ORDER — NALOXONE HYDROCHLORIDE 0.4 MG/ML
0.2 INJECTION, SOLUTION INTRAMUSCULAR; INTRAVENOUS; SUBCUTANEOUS
Status: DISCONTINUED | OUTPATIENT
Start: 2024-07-04 | End: 2024-07-07 | Stop reason: HOSPADM

## 2024-07-04 RX ORDER — OXYTOCIN/0.9 % SODIUM CHLORIDE 30/500 ML
340 PLASTIC BAG, INJECTION (ML) INTRAVENOUS CONTINUOUS PRN
Status: DISCONTINUED | OUTPATIENT
Start: 2024-07-04 | End: 2024-07-07 | Stop reason: HOSPADM

## 2024-07-04 RX ORDER — SODIUM CHLORIDE, SODIUM LACTATE, POTASSIUM CHLORIDE, CALCIUM CHLORIDE 600; 310; 30; 20 MG/100ML; MG/100ML; MG/100ML; MG/100ML
INJECTION, SOLUTION INTRAVENOUS CONTINUOUS
Status: DISCONTINUED | OUTPATIENT
Start: 2024-07-04 | End: 2024-07-04 | Stop reason: HOSPADM

## 2024-07-04 RX ORDER — BUPIVACAINE HYDROCHLORIDE 7.5 MG/ML
INJECTION, SOLUTION INTRASPINAL
Status: COMPLETED | OUTPATIENT
Start: 2024-07-04 | End: 2024-07-04

## 2024-07-04 RX ORDER — MISOPROSTOL 100 UG/1
400 TABLET ORAL
Status: DISCONTINUED | OUTPATIENT
Start: 2024-07-04 | End: 2024-07-04 | Stop reason: HOSPADM

## 2024-07-04 RX ORDER — NALOXONE HYDROCHLORIDE 0.4 MG/ML
0.1 INJECTION, SOLUTION INTRAMUSCULAR; INTRAVENOUS; SUBCUTANEOUS
Status: DISCONTINUED | OUTPATIENT
Start: 2024-07-04 | End: 2024-07-04 | Stop reason: HOSPADM

## 2024-07-04 RX ORDER — CITRIC ACID/SODIUM CITRATE 334-500MG
30 SOLUTION, ORAL ORAL
Status: COMPLETED | OUTPATIENT
Start: 2024-07-04 | End: 2024-07-04

## 2024-07-04 RX ORDER — HYDROCORTISONE 25 MG/G
CREAM TOPICAL 3 TIMES DAILY PRN
Status: DISCONTINUED | OUTPATIENT
Start: 2024-07-04 | End: 2024-07-07 | Stop reason: HOSPADM

## 2024-07-04 RX ORDER — ONDANSETRON 4 MG/1
4 TABLET, ORALLY DISINTEGRATING ORAL EVERY 30 MIN PRN
Status: DISCONTINUED | OUTPATIENT
Start: 2024-07-04 | End: 2024-07-04 | Stop reason: HOSPADM

## 2024-07-04 RX ORDER — DEXAMETHASONE SODIUM PHOSPHATE 10 MG/ML
4 INJECTION, SOLUTION INTRAMUSCULAR; INTRAVENOUS
Status: DISCONTINUED | OUTPATIENT
Start: 2024-07-04 | End: 2024-07-04 | Stop reason: HOSPADM

## 2024-07-04 RX ORDER — CEFAZOLIN SODIUM/WATER 2 G/20 ML
2 SYRINGE (ML) INTRAVENOUS SEE ADMIN INSTRUCTIONS
Status: DISCONTINUED | OUTPATIENT
Start: 2024-07-04 | End: 2024-07-04 | Stop reason: HOSPADM

## 2024-07-04 RX ORDER — METHYLERGONOVINE MALEATE 0.2 MG/ML
200 INJECTION INTRAVENOUS
Status: DISCONTINUED | OUTPATIENT
Start: 2024-07-04 | End: 2024-07-04 | Stop reason: HOSPADM

## 2024-07-04 RX ORDER — OXYTOCIN 10 [USP'U]/ML
10 INJECTION, SOLUTION INTRAMUSCULAR; INTRAVENOUS
Status: DISCONTINUED | OUTPATIENT
Start: 2024-07-04 | End: 2024-07-07 | Stop reason: HOSPADM

## 2024-07-04 RX ORDER — OXYTOCIN 10 [USP'U]/ML
10 INJECTION, SOLUTION INTRAMUSCULAR; INTRAVENOUS
Status: DISCONTINUED | OUTPATIENT
Start: 2024-07-04 | End: 2024-07-04 | Stop reason: HOSPADM

## 2024-07-04 RX ORDER — ONDANSETRON 2 MG/ML
4 INJECTION INTRAMUSCULAR; INTRAVENOUS EVERY 6 HOURS PRN
Status: DISCONTINUED | OUTPATIENT
Start: 2024-07-04 | End: 2024-07-07 | Stop reason: HOSPADM

## 2024-07-04 RX ORDER — CARBOPROST TROMETHAMINE 250 UG/ML
250 INJECTION, SOLUTION INTRAMUSCULAR
Status: DISCONTINUED | OUTPATIENT
Start: 2024-07-04 | End: 2024-07-07 | Stop reason: HOSPADM

## 2024-07-04 RX ORDER — SIMETHICONE 80 MG
80 TABLET,CHEWABLE ORAL 4 TIMES DAILY PRN
Status: DISCONTINUED | OUTPATIENT
Start: 2024-07-04 | End: 2024-07-07 | Stop reason: HOSPADM

## 2024-07-04 RX ORDER — ONDANSETRON 4 MG/1
4 TABLET, ORALLY DISINTEGRATING ORAL EVERY 6 HOURS PRN
Status: DISCONTINUED | OUTPATIENT
Start: 2024-07-04 | End: 2024-07-07 | Stop reason: HOSPADM

## 2024-07-04 RX ORDER — KETOROLAC TROMETHAMINE 30 MG/ML
30 INJECTION, SOLUTION INTRAMUSCULAR; INTRAVENOUS EVERY 6 HOURS
Status: COMPLETED | OUTPATIENT
Start: 2024-07-04 | End: 2024-07-04

## 2024-07-04 RX ORDER — METOCLOPRAMIDE 10 MG/1
10 TABLET ORAL EVERY 6 HOURS PRN
Status: DISCONTINUED | OUTPATIENT
Start: 2024-07-04 | End: 2024-07-07 | Stop reason: HOSPADM

## 2024-07-04 RX ORDER — LOPERAMIDE HCL 2 MG
2 CAPSULE ORAL
Status: DISCONTINUED | OUTPATIENT
Start: 2024-07-04 | End: 2024-07-04 | Stop reason: HOSPADM

## 2024-07-04 RX ORDER — BUPIVACAINE HYDROCHLORIDE 2.5 MG/ML
INJECTION, SOLUTION EPIDURAL; INFILTRATION; INTRACAUDAL PRN
Status: DISCONTINUED | OUTPATIENT
Start: 2024-07-04 | End: 2024-07-04

## 2024-07-04 RX ORDER — ONDANSETRON 2 MG/ML
4 INJECTION INTRAMUSCULAR; INTRAVENOUS EVERY 30 MIN PRN
Status: DISCONTINUED | OUTPATIENT
Start: 2024-07-04 | End: 2024-07-04 | Stop reason: HOSPADM

## 2024-07-04 RX ORDER — NALOXONE HYDROCHLORIDE 0.4 MG/ML
0.4 INJECTION, SOLUTION INTRAMUSCULAR; INTRAVENOUS; SUBCUTANEOUS
Status: DISCONTINUED | OUTPATIENT
Start: 2024-07-04 | End: 2024-07-07 | Stop reason: HOSPADM

## 2024-07-04 RX ORDER — DEXTROSE, SODIUM CHLORIDE, SODIUM LACTATE, POTASSIUM CHLORIDE, AND CALCIUM CHLORIDE 5; .6; .31; .03; .02 G/100ML; G/100ML; G/100ML; G/100ML; G/100ML
INJECTION, SOLUTION INTRAVENOUS CONTINUOUS
Status: DISCONTINUED | OUTPATIENT
Start: 2024-07-04 | End: 2024-07-07 | Stop reason: HOSPADM

## 2024-07-04 RX ORDER — LIDOCAINE 40 MG/G
CREAM TOPICAL
Status: DISCONTINUED | OUTPATIENT
Start: 2024-07-04 | End: 2024-07-04 | Stop reason: HOSPADM

## 2024-07-04 RX ORDER — ACETAMINOPHEN 325 MG/1
975 TABLET ORAL EVERY 6 HOURS
Status: DISCONTINUED | OUTPATIENT
Start: 2024-07-04 | End: 2024-07-07 | Stop reason: HOSPADM

## 2024-07-04 RX ORDER — CARBOPROST TROMETHAMINE 250 UG/ML
250 INJECTION, SOLUTION INTRAMUSCULAR
Status: DISCONTINUED | OUTPATIENT
Start: 2024-07-04 | End: 2024-07-04 | Stop reason: HOSPADM

## 2024-07-04 RX ORDER — OXYTOCIN 10 [USP'U]/ML
10 INJECTION, SOLUTION INTRAMUSCULAR; INTRAVENOUS
Status: DISCONTINUED | OUTPATIENT
Start: 2024-07-04 | End: 2024-07-04

## 2024-07-04 RX ORDER — LIDOCAINE 40 MG/G
CREAM TOPICAL
Status: DISCONTINUED | OUTPATIENT
Start: 2024-07-04 | End: 2024-07-07 | Stop reason: HOSPADM

## 2024-07-04 RX ORDER — IBUPROFEN 400 MG/1
800 TABLET, FILM COATED ORAL EVERY 6 HOURS
Status: DISCONTINUED | OUTPATIENT
Start: 2024-07-05 | End: 2024-07-07 | Stop reason: HOSPADM

## 2024-07-04 RX ORDER — FENTANYL CITRATE 50 UG/ML
25 INJECTION, SOLUTION INTRAMUSCULAR; INTRAVENOUS EVERY 5 MIN PRN
Status: DISCONTINUED | OUTPATIENT
Start: 2024-07-04 | End: 2024-07-04 | Stop reason: HOSPADM

## 2024-07-04 RX ORDER — LIDOCAINE 40 MG/G
CREAM TOPICAL
Status: DISCONTINUED | OUTPATIENT
Start: 2024-07-04 | End: 2024-07-04

## 2024-07-04 RX ORDER — METOCLOPRAMIDE HYDROCHLORIDE 5 MG/ML
10 INJECTION INTRAMUSCULAR; INTRAVENOUS EVERY 6 HOURS PRN
Status: DISCONTINUED | OUTPATIENT
Start: 2024-07-04 | End: 2024-07-07 | Stop reason: HOSPADM

## 2024-07-04 RX ORDER — OXYCODONE HYDROCHLORIDE 5 MG/1
5 TABLET ORAL EVERY 4 HOURS PRN
Status: DISCONTINUED | OUTPATIENT
Start: 2024-07-04 | End: 2024-07-07 | Stop reason: HOSPADM

## 2024-07-04 RX ORDER — TRANEXAMIC ACID 10 MG/ML
1 INJECTION, SOLUTION INTRAVENOUS EVERY 30 MIN PRN
Status: DISCONTINUED | OUTPATIENT
Start: 2024-07-04 | End: 2024-07-04 | Stop reason: HOSPADM

## 2024-07-04 RX ORDER — FENTANYL CITRATE 50 UG/ML
50 INJECTION, SOLUTION INTRAMUSCULAR; INTRAVENOUS EVERY 5 MIN PRN
Status: DISCONTINUED | OUTPATIENT
Start: 2024-07-04 | End: 2024-07-04 | Stop reason: HOSPADM

## 2024-07-04 RX ORDER — ACETAMINOPHEN 325 MG/1
975 TABLET ORAL ONCE
Status: COMPLETED | OUTPATIENT
Start: 2024-07-04 | End: 2024-07-04

## 2024-07-04 RX ORDER — BISACODYL 10 MG
10 SUPPOSITORY, RECTAL RECTAL DAILY PRN
Status: DISCONTINUED | OUTPATIENT
Start: 2024-07-06 | End: 2024-07-07 | Stop reason: HOSPADM

## 2024-07-04 RX ORDER — SODIUM CHLORIDE, SODIUM LACTATE, POTASSIUM CHLORIDE, CALCIUM CHLORIDE 600; 310; 30; 20 MG/100ML; MG/100ML; MG/100ML; MG/100ML
INJECTION, SOLUTION INTRAVENOUS CONTINUOUS PRN
Status: DISCONTINUED | OUTPATIENT
Start: 2024-07-04 | End: 2024-07-04

## 2024-07-04 RX ORDER — LOPERAMIDE HCL 2 MG
4 CAPSULE ORAL
Status: DISCONTINUED | OUTPATIENT
Start: 2024-07-04 | End: 2024-07-04 | Stop reason: HOSPADM

## 2024-07-04 RX ORDER — MORPHINE SULFATE 1 MG/ML
INJECTION, SOLUTION EPIDURAL; INTRATHECAL; INTRAVENOUS
Status: COMPLETED | OUTPATIENT
Start: 2024-07-04 | End: 2024-07-04

## 2024-07-04 RX ORDER — PROCHLORPERAZINE 25 MG
25 SUPPOSITORY, RECTAL RECTAL EVERY 12 HOURS PRN
Status: DISCONTINUED | OUTPATIENT
Start: 2024-07-04 | End: 2024-07-07 | Stop reason: HOSPADM

## 2024-07-04 RX ORDER — HYDROMORPHONE HCL IN WATER/PF 6 MG/30 ML
0.4 PATIENT CONTROLLED ANALGESIA SYRINGE INTRAVENOUS EVERY 5 MIN PRN
Status: DISCONTINUED | OUTPATIENT
Start: 2024-07-04 | End: 2024-07-04 | Stop reason: HOSPADM

## 2024-07-04 RX ORDER — MISOPROSTOL 100 UG/1
400 TABLET ORAL
Status: DISCONTINUED | OUTPATIENT
Start: 2024-07-04 | End: 2024-07-07 | Stop reason: HOSPADM

## 2024-07-04 RX ORDER — PROCHLORPERAZINE MALEATE 10 MG
10 TABLET ORAL EVERY 6 HOURS PRN
Status: DISCONTINUED | OUTPATIENT
Start: 2024-07-04 | End: 2024-07-07 | Stop reason: HOSPADM

## 2024-07-04 RX ORDER — AMOXICILLIN 250 MG
2 CAPSULE ORAL 2 TIMES DAILY
Status: DISCONTINUED | OUTPATIENT
Start: 2024-07-04 | End: 2024-07-07 | Stop reason: HOSPADM

## 2024-07-04 RX ORDER — AMOXICILLIN 250 MG
1 CAPSULE ORAL 2 TIMES DAILY
Status: DISCONTINUED | OUTPATIENT
Start: 2024-07-04 | End: 2024-07-07 | Stop reason: HOSPADM

## 2024-07-04 RX ORDER — OXYTOCIN/0.9 % SODIUM CHLORIDE 30/500 ML
PLASTIC BAG, INJECTION (ML) INTRAVENOUS CONTINUOUS PRN
Status: DISCONTINUED | OUTPATIENT
Start: 2024-07-04 | End: 2024-07-04

## 2024-07-04 RX ORDER — TRANEXAMIC ACID 10 MG/ML
1 INJECTION, SOLUTION INTRAVENOUS EVERY 30 MIN PRN
Status: DISCONTINUED | OUTPATIENT
Start: 2024-07-04 | End: 2024-07-07 | Stop reason: HOSPADM

## 2024-07-04 RX ORDER — OXYTOCIN/0.9 % SODIUM CHLORIDE 30/500 ML
340 PLASTIC BAG, INJECTION (ML) INTRAVENOUS CONTINUOUS PRN
Status: DISCONTINUED | OUTPATIENT
Start: 2024-07-04 | End: 2024-07-04 | Stop reason: HOSPADM

## 2024-07-04 RX ORDER — ONDANSETRON 2 MG/ML
4 INJECTION INTRAMUSCULAR; INTRAVENOUS ONCE
Status: COMPLETED | OUTPATIENT
Start: 2024-07-04 | End: 2024-07-04

## 2024-07-04 RX ORDER — MODIFIED LANOLIN
OINTMENT (GRAM) TOPICAL
Status: DISCONTINUED | OUTPATIENT
Start: 2024-07-04 | End: 2024-07-07 | Stop reason: HOSPADM

## 2024-07-04 RX ORDER — LOPERAMIDE HCL 2 MG
4 CAPSULE ORAL
Status: DISCONTINUED | OUTPATIENT
Start: 2024-07-04 | End: 2024-07-07 | Stop reason: HOSPADM

## 2024-07-04 RX ORDER — HYDROMORPHONE HCL IN WATER/PF 6 MG/30 ML
0.2 PATIENT CONTROLLED ANALGESIA SYRINGE INTRAVENOUS EVERY 5 MIN PRN
Status: DISCONTINUED | OUTPATIENT
Start: 2024-07-04 | End: 2024-07-04 | Stop reason: HOSPADM

## 2024-07-04 RX ADMIN — BUPIVACAINE 5 ML: 13.3 INJECTION, SUSPENSION, LIPOSOMAL INFILTRATION at 07:18

## 2024-07-04 RX ADMIN — MORPHINE SULFATE 0.1 MG: 1 INJECTION, SOLUTION EPIDURAL; INTRATHECAL; INTRAVENOUS at 06:28

## 2024-07-04 RX ADMIN — BUPIVACAINE HYDROCHLORIDE 15 ML: 2.5 INJECTION, SOLUTION EPIDURAL; INFILTRATION; INTRACAUDAL; PERINEURAL at 07:21

## 2024-07-04 RX ADMIN — BUPIVACAINE 5 ML: 13.3 INJECTION, SUSPENSION, LIPOSOMAL INFILTRATION at 07:21

## 2024-07-04 RX ADMIN — Medication 340 ML/HR: at 06:42

## 2024-07-04 RX ADMIN — Medication 2 G: at 05:46

## 2024-07-04 RX ADMIN — BUPIVACAINE HYDROCHLORIDE 15 ML: 2.5 INJECTION, SOLUTION EPIDURAL; INFILTRATION; INTRACAUDAL; PERINEURAL at 07:18

## 2024-07-04 RX ADMIN — SODIUM CHLORIDE, POTASSIUM CHLORIDE, SODIUM LACTATE AND CALCIUM CHLORIDE 500 ML: 600; 310; 30; 20 INJECTION, SOLUTION INTRAVENOUS at 05:51

## 2024-07-04 RX ADMIN — KETOROLAC TROMETHAMINE 30 MG: 30 INJECTION, SOLUTION INTRAMUSCULAR at 15:13

## 2024-07-04 RX ADMIN — SODIUM CHLORIDE, SODIUM LACTATE, POTASSIUM CHLORIDE, AND CALCIUM CHLORIDE: 600; 310; 30; 20 INJECTION, SOLUTION INTRAVENOUS at 07:11

## 2024-07-04 RX ADMIN — SENNOSIDES AND DOCUSATE SODIUM 2 TABLET: 50; 8.6 TABLET ORAL at 21:20

## 2024-07-04 RX ADMIN — ACETAMINOPHEN 975 MG: 325 TABLET, FILM COATED ORAL at 05:44

## 2024-07-04 RX ADMIN — ACETAMINOPHEN 975 MG: 325 TABLET, FILM COATED ORAL at 17:28

## 2024-07-04 RX ADMIN — SODIUM CHLORIDE, SODIUM LACTATE, POTASSIUM CHLORIDE, AND CALCIUM CHLORIDE: 600; 310; 30; 20 INJECTION, SOLUTION INTRAVENOUS at 06:18

## 2024-07-04 RX ADMIN — SIMETHICONE 80 MG: 80 TABLET, CHEWABLE ORAL at 21:24

## 2024-07-04 RX ADMIN — SIMETHICONE 80 MG: 80 TABLET, CHEWABLE ORAL at 08:43

## 2024-07-04 RX ADMIN — ACETAMINOPHEN 975 MG: 325 TABLET, FILM COATED ORAL at 12:02

## 2024-07-04 RX ADMIN — ONDANSETRON 4 MG: 2 INJECTION INTRAMUSCULAR; INTRAVENOUS at 06:08

## 2024-07-04 RX ADMIN — KETOROLAC TROMETHAMINE 30 MG: 30 INJECTION, SOLUTION INTRAMUSCULAR at 21:20

## 2024-07-04 RX ADMIN — SODIUM CHLORIDE, POTASSIUM CHLORIDE, SODIUM LACTATE AND CALCIUM CHLORIDE 1000 ML: 600; 310; 30; 20 INJECTION, SOLUTION INTRAVENOUS at 03:30

## 2024-07-04 RX ADMIN — PHENYLEPHRINE HYDROCHLORIDE 0.5 MCG/KG/MIN: 10 INJECTION INTRAVENOUS at 06:28

## 2024-07-04 RX ADMIN — BUPIVACAINE HYDROCHLORIDE IN DEXTROSE 1.6 ML: 7.5 INJECTION, SOLUTION SUBARACHNOID at 06:28

## 2024-07-04 RX ADMIN — SODIUM CITRATE AND CITRIC ACID MONOHYDRATE 30 ML: 500; 334 SOLUTION ORAL at 05:44

## 2024-07-04 RX ADMIN — KETOROLAC TROMETHAMINE 30 MG: 30 INJECTION, SOLUTION INTRAMUSCULAR at 08:42

## 2024-07-04 ASSESSMENT — ACTIVITIES OF DAILY LIVING (ADL)
ADLS_ACUITY_SCORE: 18
ADLS_ACUITY_SCORE: 18
ADLS_ACUITY_SCORE: 27
ADLS_ACUITY_SCORE: 18

## 2024-07-04 NOTE — OR NURSING
.PACU Transfer Note    Dania Bhatti was transferred to [facility] via bed.  Equipment used for transport:  none.  Accompanied by:  Latia & Jimbo  Prescriptions were: none    PACU Respiratory Event Documentation     1) Episodes of Apnea greater than or equal to 10 seconds: no    2) Bradypnea - less than 8 breaths per minute: no    3) Pain score on 0 to 10 scale: 2/10-states tolerable    4) Pain-sedation mismatch (yes or no): no    5) Repeated 02 desaturation less than 90% (yes or no): no    Anesthesia notified? (yes or no): no    Any of the above events occuring repeatedly in separate 30 minute intervals may be considered recurrent PACU respiratory events.    Patient stable and meets phase 1 discharge criteria for transport from PACU.     Report given to WALLY Choi @ [id number].

## 2024-07-04 NOTE — ANESTHESIA PREPROCEDURE EVALUATION
Anesthesia Pre-Procedure Evaluation    Patient: Dania Bhatti   MRN: [id number] : 1995        Procedure : Procedure(s):   SECTION          History reviewed. No pertinent past medical history.   Past Surgical History:   Procedure Laterality Date     SECTION N/A 2021    Procedure:  SECTION;  Surgeon: Oh Hong MD;  Location: [facility] OR      No Known Allergies   Social History     Tobacco Use    Smoking status: Never    Smokeless tobacco: Never   Substance Use Topics    Alcohol use: Not Currently      Wt Readings from Last 1 Encounters:   24 72.6 kg (160 lb)        Anesthesia Evaluation   Pt has had prior anesthetic. Type: Regional (hx of ).        ROS/MED HX  ENT/Pulmonary:  - neg pulmonary ROS     Neurologic:  - neg neurologic ROS     Cardiovascular:  - neg cardiovascular ROS     METS/Exercise Tolerance: >4 METS    Hematologic:  - neg hematologic  ROS     Musculoskeletal:  - neg musculoskeletal ROS     GI/Hepatic:  - neg GI/hepatic ROS     Renal/Genitourinary:  - neg Renal ROS     Endo:  - neg endo ROS     Psychiatric/Substance Use:  - neg psychiatric ROS     Infectious Disease:  - neg infectious disease ROS     Malignancy:  - neg malignancy ROS     Other:      (+) Possibly pregnant, , ,         Physical Exam    Airway        Mallampati: II   TM distance: > 3 FB   Neck ROM: full   Mouth opening: > 3 cm    Respiratory Devices and Support         Dental       (+) Completely normal teeth      Cardiovascular   cardiovascular exam normal       Rhythm and rate: regular and normal     Pulmonary   pulmonary exam normal        breath sounds clear to auscultation           OUTSIDE LABS:  CBC:   Lab Results   Component Value Date    WBC 10.7 2024    WBC 9.3 2024    HGB 12.1 2024    HGB 12.2 2024    HCT 35.3 2024    HCT 36.2 2024     2024     2024     BMP:   Lab Results   Component Value Date     2024  "    07/21/2021    POTASSIUM 3.8 05/29/2024    POTASSIUM 3.7 07/21/2021    CHLORIDE 102 05/29/2024    CHLORIDE 105 07/21/2021    CO2 23 05/29/2024    CO2 25 07/21/2021    BUN 7.1 05/29/2024    BUN 8 07/21/2021    CR 0.53 05/29/2024    CR 0.57 (L) 07/21/2021    GLC 91 07/04/2024    GLC 93 05/29/2024     COAGS: No results found for: \"PTT\", \"INR\", \"FIBR\"  POC: No results found for: \"BGM\", \"HCG\", \"HCGS\"  HEPATIC:   Lab Results   Component Value Date    ALBUMIN 3.8 05/29/2024    PROTTOTAL 7.0 05/29/2024    ALT 8 05/29/2024    AST 14 05/29/2024    ALKPHOS 90 05/29/2024    BILITOTAL 0.2 05/29/2024     OTHER:   Lab Results   Component Value Date    LACT 0.5 (L) 07/21/2021    AKIN 9.0 05/29/2024    LIPASE 11 07/21/2021       Anesthesia Plan    ASA Status:  2    NPO Status:  NPO Appropriate    Anesthesia Type: Spinal.   Induction: N/a.   Maintenance: N/A.        Consents    Anesthesia Plan(s) and associated risks, benefits, and realistic alternatives discussed. Questions answered and patient/representative(s) expressed understanding.     - Discussed: Risks, Benefits and Alternatives for BOTH SEDATION and the PROCEDURE were discussed     - Discussed with:  Patient      - Specific Concerns: PDPH.     - Extended Intubation/Ventilatory Support Discussed: No.      - Patient is DNR/DNI Status: No     Use of blood products discussed: No .     Postoperative Care    Pain management: Peripheral nerve block (Single Shot) (TAP blocks).   PONV prophylaxis: Ondansetron (or other 5HT-3)     Comments:               NINI Bernard CRNA    I have reviewed the pertinent notes and labs in the chart from the past 30 days and (re)examined the patient.  Any updates or changes from those notes are reflected in this note.                  "

## 2024-07-04 NOTE — ANESTHESIA POSTPROCEDURE EVALUATION
Patient: Dania Bhatti    Procedure: Procedure(s):   SECTION       Anesthesia Type:  Spinal    Note:  Disposition: Inpatient   Postop Pain Control: Uneventful            Sign Out: Well controlled pain   PONV: No   Neuro/Psych: Uneventful            Sign Out: Acceptable/Baseline neuro status   Airway/Respiratory: Uneventful            Sign Out: Acceptable/Baseline resp. status   CV/Hemodynamics: Uneventful            Sign Out: Acceptable CV status; No obvious hypovolemia; No obvious fluid overload   Other NRE: NONE   DID A NON-ROUTINE EVENT OCCUR? No           Last vitals:  Vitals Value Taken Time   BP 90/65 24 0750   Temp 97.2  F (36.2  C) 24 0745   Pulse 101 24 0750   Resp 16 24 0745   SpO2 98 % 24 0752   Vitals shown include unfiled device data.    Electronically Signed By: NINI Bernard CRNA  2024  7:53 AM

## 2024-07-04 NOTE — ANESTHESIA PROCEDURE NOTES
"Intrathecal injection Procedure Note    Pre-Procedure   Staff -        CRNA: Isra Castaneda APRN CRNA       Performed By: CRNA       Location: OR       Procedure Start/Stop Times: 2024 6:21 AM and 2024 6:28 AM       Pre-Anesthestic Checklist: patient identified, IV checked, risks and benefits discussed, informed consent, monitors and equipment checked, pre-op evaluation, at physician/surgeon's request and post-op pain management  Timeout:       Correct Patient: Yes        Correct Procedure: Yes        Correct Site: Yes        Correct Position: Yes   Procedure Documentation  Procedure: intrathecal injection       Diagnosis:  analgesia       Patient Position: sitting       Patient Prep/Sterile Barriers: sterile gloves, mask, patient draped       Skin prep: Chloraprep       Insertion Site: L3-4. (midline approach).       Needle Gauge: 25.        Needle Length (Inches): 3.5        Spinal Needle Type: Pencan       Introducer used       Introducer: 20 G       # of attempts: 1 and  # of redirects:  1    Assessment/Narrative         Paresthesias: No.       CSF fluid: clear.       Opening pressure was cmH2O while  Sitting.      Medication(s) Administered   0.75% Hyperbaric Bupivacaine (Intrathecal) - Intrathecal   1.6 mL - 2024 6:28:00 AM  Morphine PF 1 mg/mL (Intrathecal) - Intrathecal   0.1 mg - 2024 6:28:00 AM  Medication Administration Time: 2024 6:21 AM      FOR [facility] ([facility]) ONLY:   Pain Team Contact information: please page the Pain Team Via [employer]. Search \"Pain\". During daytime hours, please page the attending first. At night please page the resident first.      "

## 2024-07-04 NOTE — OR NURSING
Birth of viable baby boy @ 4114 Cord blood and umbilical cord segment labeled and given to [facility] RN.

## 2024-07-04 NOTE — PROGRESS NOTES
Pt continues to contract every 2-5 minutes after LR bolus. Tearful and unable to talk through contractions. Category 1 fetal strip. ROM + results came back as positive. Dr. William notified. Plan is to prep pt for a  section. Pt aware and okay with this plan.

## 2024-07-04 NOTE — H&P
[facility] Labor and Delivery History and Physical    Dania Bhatti MRN# [id number]   Age: 28 year old YOB: 1995     Date of Admission:  2024    Primary care provider: No Ref-Primary, Physician           Chief Complaint:   Dania Bhatti is a 28 year old  at 38w3d by LMP c/w 8w6d US admitted for early labor, SROM. She has a history of prior  section for category II FHT remote from delivery, desires repeat  section rather than TOLAC.          Pregnancy history:     OBSTETRIC HISTORY:    OB History    Para Term  AB Living   2 1 1 0 0 1   SAB IAB Ectopic Multiple Live Births   0 0 0 0 1      # Outcome Date GA Lbr Nathan/2nd Weight Sex Type Anes PTL Lv   2 Current            1 Term 21 41w2d  3.479 kg (7 lb 10.7 oz) F CS-LTranv Spinal N LI      Name: HENRIETTA,FEMALE-DANIA      Apgar1: 9  Apgar5: 9       EDC: Estimated Date of Delivery: Jul 15, 2024    Prenatal Labs:   Lab Results   Component Value Date    AS Negative 2023    HEPBANG Nonreactive 2023    HGB 12.2 2024       GBS Status:   negative    Active Problem List  Patient Active Problem List   Diagnosis    Encounter for triage in pregnant patient    Normal labor    At risk for alteration in fetal status       Medication Prior to Admission  Medications Prior to Admission   Medication Sig Dispense Refill Last Dose    famotidine (PEPCID) 20 MG tablet Take 1 tablet (20 mg) by mouth 2 times daily as needed (stomach pain) 60 tablet 2 Unknown    Prenatal Vit-Fe Fumarate-FA (PRENATAL MULTIVITAMIN  PLUS IRON) 27-1 MG TABS Take 1 tablet by mouth daily   Past Week   .        Maternal Past Medical History:   History reviewed. No pertinent past medical history.  Past Surgical History:   Procedure Laterality Date     SECTION N/A 2021    Procedure:  SECTION;  Surgeon: Oh Hong MD;  Location:  OR                       Family History:   History  reviewed. No pertinent family history.  Family history reviewed and updated in [facility]            Social History:     Social History     Tobacco Use    Smoking status: Never    Smokeless tobacco: Never   Substance Use Topics    Alcohol use: Not Currently            Review of Systems:   The Review of Systems is negative other than noted in the HPI          Physical Exam:   Patient Vitals for the past 8 hrs:   BP Temp Temp src Pulse Resp SpO2   24 0300 123/81 98.4  F (36.9  C) Tympanic 96 16 100 %   24 0043 -- (!) 32  F (0  C) -- -- -- --     Gen: resting in bed  CV: RRR  Resp: CTAB  Abd: gravid, soft, nontender  Ext: nontender    Cervix: 1.5/50 per RN exam  Membranes: SROM, clear  EFW: 8 lbs  Presentation:Cephalic    Fetal Heart Rate Tracin, mod variability, + accels, no decels  Tocometer: 2-4 ctx in 10 min        Assessment:   Dania Bhatti is a 28 year old  at 38w3d admitted with early labor, SROM with desire for repeat  section.          Plan:   FWB: Cat I, reactive. EFW 8lbs  Ancef for preop abx  OR crew notified    Daily William MD

## 2024-07-04 NOTE — ANESTHESIA CARE TRANSFER NOTE
Patient: Dania Bhatti    Procedure: Procedure(s):   SECTION       Diagnosis: History of  section [Z98.891]  Diagnosis Additional Information: No value filed.    Anesthesia Type:   Spinal     Note:    Oropharynx: spontaneously breathing and oropharynx clear of all foreign objects  Level of Consciousness: awake  Oxygen Supplementation: room air    Independent Airway: airway patency satisfactory and stable  Dentition: dentition unchanged  Vital Signs Stable: post-procedure vital signs reviewed and stable  Report to RN Given: handoff report given  Patient transferred to: PACU    Handoff Report: Identifed the Patient, Identified the Reponsible Provider, Reviewed the pertinent medical history, Discussed the surgical course, Reviewed Intra-OP anesthesia mangement and issues during anesthesia, Set expectations for post-procedure period and Allowed opportunity for questions and acknowledgement of understanding      Vitals:  Vitals Value Taken Time   BP     Temp     Pulse     Resp     SpO2         Electronically Signed By: NINI Bernard CRNA  2024  7:38 AM

## 2024-07-04 NOTE — PLAN OF CARE
Pt is doing well. She has been up out of bed. Tolerating regular diet. Barahona removed at 1300, due to void. Lochia is light, no clots. Fundus firm. 1 above umbilicus and midline. Dressing over  incision is clean, dry and intact. Vitals stable. Pain in mid abdomen radiating up right shoulder. Simethicone given with some relief. Tylenol and Toradol alternating per mar. Using abdominal binder. Declines ice at this time. Breastfeeding with minimal assistance every 3 hours. Bonding well with . -Harpreet is at bedside and attentive.    BP 98/52   Pulse 66   Temp 97.1  F (36.2  C) (Tympanic)   Resp 18   LMP 10/09/2023   SpO2 99%   Breastfeeding Unknown

## 2024-07-04 NOTE — PROGRESS NOTES
[facility]: [facility]  2[facility]40 [facility].  Reason for maternal/fetal assessment is uterine contractions and leaking vaginal fluid. Patient reports contractions throughout the day but becoming consistent at 10 pm and progressively more painful every 10 minutes lasting 30-60 seconds. Patient denies vaginal bleeding, nausea, vomiting, headache, visual disturbances, epigastric or RUQ pain, significant edema. Patient reports fetal movement is normal. Patient is a 38w3d . Prenatal record reviewed. Pregnancy has been uncomplicated. Support person is present.     Fetal HR baseline was 135, variability is moderate (amplitude range 6 to 25 bpm). Accelerations: increase 15 bpm above baseline lasting 15 seconds. Decelerations: variable. Uterine assessment is strong by palpation during contractions and soft by palpation at rest. Cervix 1.5 cm dilated and 40-50% effaced. Fetal station -3. Fetal presentation cephalic per cervical exam. Membranes: fluid noted with sterile speculum exam.    Vital signs wnl.     Action: Verbal consent for EFM. Triage assessment completed. Patient may meet criteria for early labor discharge.     Response: Patient verbalized understanding of triage assessment. Will contact Dr. William with assessment and consideration of early labor discharge vs admission.

## 2024-07-04 NOTE — ANESTHESIA PROCEDURE NOTES
TAP Procedure Note    Pre-Procedure   Staff -        CRNA: Isra Castaneda APRN CRNA       Performed By: CRNA       Location: OR       Procedure Start/Stop Times: 7/4/2024 7:15 AM and 7/4/2024 7:21 AM       Pre-Anesthestic Checklist: patient identified, IV checked, site marked, risks and benefits discussed, informed consent, monitors and equipment checked, pre-op evaluation, at physician/surgeon's request and post-op pain management  Timeout:       Correct Patient: Yes        Correct Procedure: Yes        Correct Site: Yes        Correct Position: Yes        Correct Laterality: Yes        Site Marked: Yes  Procedure Documentation  Procedure: TAP       Diagnosis: POST OPERATIVE PAIN MANAGEMENT       Laterality: bilateral       Patient Position: supine       Patient Prep/Sterile Barriers: sterile gloves, mask       Skin prep: Chloraprep       Needle Type: insulated and short bevel       Needle Gauge: 20.        Needle Length (Inches): 4        Ultrasound guided       1. Ultrasound was used to identify targeted nerve, plexus, vascular marker, or fascial plane and place a needle adjacent to it in real-time.       2. Ultrasound was used to visualize the spread of anesthetic in close proximity to the above referenced structure.       3. A permanent image is entered into the patient's record.       4. The visualized anatomic structures appeared normal.       5. There were no apparent abnormal pathologic findings.    Assessment/Narrative         The placement was negative for: blood aspirated, painful injection and site bleeding       Paresthesias: No.       Bolus given via needle..        Secured via.        Insertion/Infusion Method: Single Shot       Complications: none       Injection made incrementally with aspirations every 5 mL.    Medication(s) Administered   Medication Administration Time: 7/4/2024 7:15 AM      FOR [facility] ([facility]) ONLY:   Pain Team Contact information: please page the Pain Team Via [facility]  "Search \"Pain\". During daytime hours, please page the attending first. At night please page the resident first.      "

## 2024-07-04 NOTE — PROGRESS NOTES
Pt voided 50mL, unable to void anymore at this time. Bladder scanned for 336 mL. Will push fluids and try again in an hour.

## 2024-07-04 NOTE — OP NOTE
[facility]  Obstetrics Service Operative Report    Surgery Date:  2024  Surgeon(s): Daily William MD   Assistants: Rosalie Navas MD [facility]    Preoperative Diagnoses:  1.  Intrauterine pregnancy at 38w3d  2.  History of  section x1  3.  Early labor, SROM    Postoperative diagnoses: Same     Procedure performed:  Repeat low segment transverse  section via Pfannenstiel incision with double layer uterine closure    Anesthesia:  Spinal   Quant Blood Loss (mL): 403 mL  Specimens: None  Complications: None      Operative findings: Single viable male infant at 0642 hours on 2024. Apgars of 9 and 10 at one and five minutes.  Birth weight (GM): pending.  Fetal presentation: cephalic.  Position: PEG  Amniotic fluid: clear.  Placenta intact with 3 vessel cord.   Normal appearing uterus, fallopian tubes, ovaries.  No intraabdominal adhesions. Moderately dense rectofascial adhesions      Indication: Dania Bhatti is a 28 year old, , who was admitted at 38w3d by LMP c/w  8w6d ultrasound for early labor, SROM.  She had a history of prior  section and desired repeat. The risks, benefits, and alternatives of  delivery were explained and the patient agreed to proceed.     Procedure details:  After obtaining informed consent, the patient was taken to the operating room. She received 2g Ancef prior to the skin incision. She was placed in the dorsal supine position with a leftward tilt and prepped and draped in the usual sterile fashion. Following test of adequate spinal anesthesia, the abdomen was entered through a transverse skin incision through her previous scar. The skin incision was made sharply and carried through the subcutaneous tissue to the fascia.  Fascia was incised in the midline and extended laterally with the Valentin scissors.  The superior margin of the fascial incision was grasped with Kocher clamps and dissected from the underlying muscle sharp and blunt  dissecton, which was then repeated at the lower margin of the fascial incision.  The muscle was  in the midline.  The peritoneum was entered bluntly and the opening extended by digital dissection with care to avoid the bladder.  An Amilcar O retractor was placed. The lower segment of the uterus was opened sharply in a transverse fashion and extended with digital pressure. The infant's head was elevated to the level of the hysterotomy and was delivered atraumatically. The cord was doubly clamped and cut and the infant was handed off Dr Navas. The placenta was expressed.  The uterus was exteriorized from the abdomen and cleared of all clots and debris.  The uterus was massaged and was noted to be firm.  Oxytocin was given through the running IV.  With massage as well as administration of oxytocin, good uterine tone was achieved. The hysterotomy was repaired with 0-vicryl suture in a running locked fashion. A 2nd layer of 0-monocryl was used to imbricate the incision and good hemostasis was achieved. The bladder flap was inspected and found to be hemostatic.      The posterior cul-de-sac was suctioned and the uterus was returned to the abdomen.  The bilateral pericolic gutters were suctioned.  The hysterotomy was again inspected and found to have no active sites of bleeding.  The abdominal wall was examined and found to have no active sites of bleeding.  The fascia was closed with a running suture of 0-vicryl.  Subcutaneous tissue was irrigated. Areas that were oozing were controlled with cautery. The subcutaneous tissue was reapproximated with 3-0 plain gut. The skin was closed with 4-0 vicryl. The patient tolerated the procedure well and was taken to the recovery room in stable condition. All sponge, needle and instrument counts were correct x2.     Assistant: Dr Navas was requested to be present for this  section due to early term, SROM and potential need for  resuscitation.    Daily  Stewart  Ob/Gyn   7/4/2024 7:26 AM

## 2024-07-05 LAB
HGB BLD-MCNC: 9.7 G/DL (ref 11.7–15.7)
T PALLIDUM AB SER QL: NONREACTIVE

## 2024-07-05 PROCEDURE — 36415 COLL VENOUS BLD VENIPUNCTURE: CPT | Performed by: OBSTETRICS & GYNECOLOGY

## 2024-07-05 PROCEDURE — 85018 HEMOGLOBIN: CPT | Performed by: OBSTETRICS & GYNECOLOGY

## 2024-07-05 PROCEDURE — 250N000013 HC RX MED GY IP 250 OP 250 PS 637: Performed by: OBSTETRICS & GYNECOLOGY

## 2024-07-05 PROCEDURE — 120N000001 HC R&B MED SURG/OB

## 2024-07-05 RX ADMIN — ACETAMINOPHEN 975 MG: 325 TABLET, FILM COATED ORAL at 01:19

## 2024-07-05 RX ADMIN — ACETAMINOPHEN 975 MG: 325 TABLET, FILM COATED ORAL at 15:10

## 2024-07-05 RX ADMIN — SIMETHICONE 80 MG: 80 TABLET, CHEWABLE ORAL at 08:05

## 2024-07-05 RX ADMIN — SIMETHICONE 80 MG: 80 TABLET, CHEWABLE ORAL at 11:58

## 2024-07-05 RX ADMIN — ACETAMINOPHEN 975 MG: 325 TABLET, FILM COATED ORAL at 21:14

## 2024-07-05 RX ADMIN — ACETAMINOPHEN 975 MG: 325 TABLET, FILM COATED ORAL at 09:07

## 2024-07-05 RX ADMIN — IBUPROFEN 800 MG: 400 TABLET, FILM COATED ORAL at 05:18

## 2024-07-05 RX ADMIN — IBUPROFEN 800 MG: 400 TABLET, FILM COATED ORAL at 17:52

## 2024-07-05 RX ADMIN — IBUPROFEN 800 MG: 400 TABLET, FILM COATED ORAL at 11:58

## 2024-07-05 RX ADMIN — SENNOSIDES AND DOCUSATE SODIUM 2 TABLET: 50; 8.6 TABLET ORAL at 21:14

## 2024-07-05 RX ADMIN — IBUPROFEN 800 MG: 400 TABLET, FILM COATED ORAL at 23:31

## 2024-07-05 RX ADMIN — SENNOSIDES AND DOCUSATE SODIUM 1 TABLET: 50; 8.6 TABLET ORAL at 08:04

## 2024-07-05 ASSESSMENT — ACTIVITIES OF DAILY LIVING (ADL)
ADLS_ACUITY_SCORE: 18

## 2024-07-05 NOTE — LACTATION NOTE
This note was copied from a baby's chart.  INPATIENT LACTATION CONSULT      Consult with Dania and gita regarding breastfeeding.  Dania nursed her last child f months with no problems. Obvious rooting with a strong latch observed this feeding session.  Rhythmic and aggressive suckling also noted.  Instructed Dania on correct positioning and technique when latching babe on.  Dania is independent with latching babe onto breast.  Minimal assistance required.  Encouraged Dania on the importance of frequent feedings throughout the day (at least 8-12 feedings in a 24 hour period) and skin to skin contact.  Dania demonstrated and states she understands all information given.    Shari Juarez, RN, IBCLC  Lactation Consultant  [facility]

## 2024-07-05 NOTE — PLAN OF CARE
Dania Bhatti is doing well after  section. Her dressing has been removed and incision look dry and well approximated. Incision is closed with steri strips in place... Fundus is firm with light bleeding and no clots. Patient is breast feeding well. Patient has minimal amounts of pain that is well managed with oral analgesics and ice. Patient is ambulating independently in room. She is voiding, spontaneously and without difficulty.  Patient has verbalized understanding of routine cares and warning signs.

## 2024-07-05 NOTE — PROGRESS NOTES
Doing well this am.  Painful 6/10.  Ibuprofen helping, hasn't taken oxy yet.  Breastfeeding.  Uop was low initially at 100cc in 2 hours x2 but now was 377 with 180 residual.  Urine is clear.  No urgency.  Barahona has been out since 9pm.    /72 (BP Location: Left arm, Patient Position: Semi-Romero's, Cuff Size: Adult Regular)   Pulse 99   Temp 98.2  F (36.8  C) (Tympanic)   Resp 16   LMP 10/09/2023   SpO2 98%   Breastfeeding Unknown     Incision c/d/I, ffbu    Doing well.  Home tomorrow likely

## 2024-07-05 NOTE — PLAN OF CARE
Temp: 98.2  F (36.8  C) Temp src: Tympanic BP: 107/72 Pulse: 99   Resp: 16 SpO2: 98 % O2 Device: None (Room air)      Assessments as charted. Independently breastfeeding every 2-3 hours. Voiding spontaneously. Around the clock analgesia utilized for pain. Fundus firm midline at the umbilicus, scant amount of lochia rubra. Up independently. IV SL.  at bedside and participating in care.

## 2024-07-06 VITALS
HEART RATE: 72 BPM | DIASTOLIC BLOOD PRESSURE: 66 MMHG | TEMPERATURE: 98.4 F | SYSTOLIC BLOOD PRESSURE: 107 MMHG | RESPIRATION RATE: 16 BRPM | OXYGEN SATURATION: 96 %

## 2024-07-06 PROCEDURE — 250N000013 HC RX MED GY IP 250 OP 250 PS 637: Performed by: OBSTETRICS & GYNECOLOGY

## 2024-07-06 PROCEDURE — 120N000001 HC R&B MED SURG/OB

## 2024-07-06 RX ORDER — OXYCODONE HYDROCHLORIDE 5 MG/1
5 TABLET ORAL EVERY 4 HOURS PRN
Qty: 20 TABLET | Refills: 0 | Status: SHIPPED | OUTPATIENT
Start: 2024-07-06

## 2024-07-06 RX ORDER — IBUPROFEN 800 MG/1
800 TABLET, FILM COATED ORAL EVERY 6 HOURS
Qty: 60 TABLET | Refills: 0 | Status: SHIPPED | OUTPATIENT
Start: 2024-07-06

## 2024-07-06 RX ADMIN — SIMETHICONE 80 MG: 80 TABLET, CHEWABLE ORAL at 16:45

## 2024-07-06 RX ADMIN — ACETAMINOPHEN 975 MG: 325 TABLET, FILM COATED ORAL at 09:10

## 2024-07-06 RX ADMIN — IBUPROFEN 800 MG: 400 TABLET, FILM COATED ORAL at 18:18

## 2024-07-06 RX ADMIN — ACETAMINOPHEN 975 MG: 325 TABLET, FILM COATED ORAL at 03:16

## 2024-07-06 RX ADMIN — ACETAMINOPHEN 975 MG: 325 TABLET, FILM COATED ORAL at 15:08

## 2024-07-06 RX ADMIN — IBUPROFEN 800 MG: 400 TABLET, FILM COATED ORAL at 11:51

## 2024-07-06 RX ADMIN — SIMETHICONE 80 MG: 80 TABLET, CHEWABLE ORAL at 23:30

## 2024-07-06 RX ADMIN — IBUPROFEN 800 MG: 400 TABLET, FILM COATED ORAL at 05:49

## 2024-07-06 RX ADMIN — ACETAMINOPHEN 975 MG: 325 TABLET, FILM COATED ORAL at 21:29

## 2024-07-06 ASSESSMENT — ACTIVITIES OF DAILY LIVING (ADL)
ADLS_ACUITY_SCORE: 18

## 2024-07-06 NOTE — PLAN OF CARE
Vitals stable and assessment WNL. Asymptomatic for preeclampsia. Pt reports minimal bleeding with no clots. Pt up ab neptali in room, voiding without difficulty, passing gas, and tolerating food and fluids well. Pain well controlled with scheduled analgesics and ice packs, pt denies needing any additional pain medications or interventions. Pt attentive to  needs.

## 2024-07-06 NOTE — DISCHARGE SUMMARY
Discharge summary    Reason for hospitalization: repeat c section  Admission dx: labor, previous c/s   Hospital course: Dania was admitted for repeat c section at 38/3.  She underwent uncomplicated repeat c/s with moderately dense retrofascial adhesions.  Ebl 403.    Post op course uncomplicated    Discharge disposition: excellent  Discharge diet: regular  Discharge activity: 25 pound lifting restriction  Discharge rx: oxycodone, ibuprofen, colace  Discharge follow up: six week postpartum visit with dr. William

## 2024-07-07 PROCEDURE — 250N000013 HC RX MED GY IP 250 OP 250 PS 637: Performed by: OBSTETRICS & GYNECOLOGY

## 2024-07-07 RX ORDER — OXYCODONE HYDROCHLORIDE 5 MG/1
5 TABLET ORAL EVERY 4 HOURS PRN
Qty: 20 TABLET | Refills: 0 | Status: SHIPPED | OUTPATIENT
Start: 2024-07-07 | End: 2024-07-12

## 2024-07-07 RX ORDER — IBUPROFEN 800 MG/1
800 TABLET, FILM COATED ORAL EVERY 8 HOURS PRN
Qty: 60 TABLET | Refills: 0 | Status: SHIPPED | OUTPATIENT
Start: 2024-07-07

## 2024-07-07 RX ADMIN — IBUPROFEN 800 MG: 400 TABLET, FILM COATED ORAL at 00:02

## 2024-07-07 RX ADMIN — IBUPROFEN 800 MG: 400 TABLET, FILM COATED ORAL at 06:07

## 2024-07-07 RX ADMIN — ACETAMINOPHEN 975 MG: 325 TABLET, FILM COATED ORAL at 03:30

## 2024-07-07 RX ADMIN — ACETAMINOPHEN 975 MG: 325 TABLET, FILM COATED ORAL at 09:33

## 2024-07-07 ASSESSMENT — ACTIVITIES OF DAILY LIVING (ADL)
ADLS_ACUITY_SCORE: 18

## 2024-07-07 NOTE — DISCHARGE INSTRUCTIONS
Warning Signs after Having a Baby    Keep this paper on your fridge or somewhere else where you can see it.    Call your provider if you have any of these symptoms up to 12 weeks after having your baby.    Thoughts of hurting yourself or your baby  Pain in your chest or trouble breathing  Severe headache not helped by pain medicine  Eyesight concerns (blurry vision, seeing spots or flashes of light, other changes to eyesight)  Fainting, shaking or other signs of a seizure    Call 9-1-1 if you feel that it is an emergency.     The symptoms below can happen to anyone after giving birth. They can be very serious. Call your provider if you have any of these warning signs.    My provider s phone number: _______________________    Losing too much blood (hemorrhage)    Call your provider if you soak through a pad in less than an hour or pass blood clots bigger than a golf ball. These may be signs that you are bleeding too much.    Blood clots in the legs or lungs    After you give birth, your body naturally clots its blood to help prevent blood loss. Sometimes this increased clotting can happen in other areas of the body, like the legs or lungs. This can block your blood flow and be very dangerous.     Call your provider if you:  Have a red, swollen spot on the back of your leg that is warm or painful when you touch it.   Are coughing up blood.     Infection    Call your provider if you have any of these symptoms:  Fever of 100.4 F (38 C) or higher.  Pain or redness around your stitches if you had an incision.   Any yellow, white, or green fluid coming from places where you had stitches or surgery.    Mood Problems (postpartum depression)    Many people feel sad or have mood changes after having a baby. But for some people, these mood swings are worse.     Call your provider right away if you feel so anxious or nervous that you can't care for yourself or your baby.    Preeclampsia (high blood pressure)    Even if you  didn't have high blood pressure when you were pregnant, you are at risk for the high blood pressure disease called preeclampsia. This risk can last up to 12 weeks after giving birth.     Call your provider if you have:   Pain on your right side under your rib cage  Sudden swelling in the hands and face    Remember: You know your body. If something doesn't feel right, get medical help.     For informational purposes only. Not to replace the advice of your health care provider. Copyright 2020 [facility]. All rights reserved. Clinically reviewed by Azul Ruvalcaba, RNC-OB, MSN. Kuddle [id number] - Rev 02/23.

## 2024-07-07 NOTE — PLAN OF CARE
Patient discharged to home with spouse and baby at        .  She voiced understanding of all warning signs and follow up appointments.

## 2024-07-07 NOTE — PROGRESS NOTES
Patient is POD#2  Feeling well.  UOP improved, voided several times with <100 residuals.  Baby with elevated bili, will need to stay until tomorrow.  Incision c/d/I.  Breastfeeding is going well.  Pt already has a breast pump.  Plan on home tomorrow.

## 2024-07-07 NOTE — PLAN OF CARE
Dania Bhatti is doing well after  section. Her dressing has been removed and incision look dry and well approximated. Incision is closed with steri strips in place.. There is some bruising underneath the incision that is not painful to the touch.  Fundus is firm with light bleeding and no clots. Patient is breast feeding well independently. Patient has minimal amounts of pain that is well managed with oral analgesics. Patient is ambulating independently in room. Encouraged more ambulation to help with gas pains. She is voiding, spontaneously and without difficulty. Bowel sounds are active.  Patient has verbalized understanding of routine cares and warning signs.

## 2024-07-07 NOTE — PLAN OF CARE
Goal Outcome Evaluation:  A&O, VSS, Afebrile, Pain is being well managed with scheduled pain medications, ice packs, and simethicone to help alleviate gas pain. Patient is up ab neptali. Patient is attentive to infant's needs and breastfeeding independently. Fundus is firm midline and at umbilicus. Incision is CDI with bruising under incision.  /66 (BP Location: Left arm, Cuff Size: Adult Regular)   Pulse 72   Temp 98.4  F (36.9  C) (Tympanic)   Resp 16   LMP 10/09/2023   SpO2 96%   Breastfeeding Unknown         José Myles on 7/7/2024 at 2:44 AM

## 2024-07-08 ENCOUNTER — ANESTHESIA (OUTPATIENT)
Dept: SURGERY | Facility: OTHER | Age: 29
End: 2024-07-08
Payer: COMMERCIAL

## 2024-07-08 ENCOUNTER — LACTATION ENCOUNTER (OUTPATIENT)
Dept: OBGYN | Facility: OTHER | Age: 29
End: 2024-07-08

## 2024-07-08 ENCOUNTER — HOSPITAL ENCOUNTER (OUTPATIENT)
Dept: OBGYN | Facility: OTHER | Age: 29
Discharge: HOME | End: 2024-07-08
Payer: COMMERCIAL

## 2024-07-08 PROCEDURE — S9443 LACTATION CLASS: HCPCS | Performed by: REGISTERED NURSE

## 2024-07-08 NOTE — LACTATION NOTE
This note was copied from a baby's chart.  Outpatient Lactation Visit    Rafa Bhatti  [id number]    Consultation Date: 2024     Reason for Lactation Referral: Initial Lactation Consult    Baby's : 2024    Baby's Current Age: 4 day old  Baby's Gestational Age: Gestational Age: 38w3d    Primary Care Provider: No Ref-Primary, Physician    Presenting Problem (concerns as stated by parent): no concerns - repeat bilirubin level and hemoglobin level per MD order    MATERNAL HISTORY   History of Breast Surgery: no  Breast Changes During Pregnancy: no  Breast Feeding History: nursed first child for 18 months with no problems  Maternal Meds: daily prenatal vitamin  Pregnancy Complications: none  Anesthesia during labor: spinal    MATERNAL ASSESSMENT    Breast Size: average, symmetrical, soft after feeding and filling prior to feeding  Nipple Appearance - Left: slightly cracked, with signs of healing, education on further healing techniques provided  Nipple Appearance - Right: slightly cracked, with signs of healing, education on further healing techniques provided  Nipple Erectility - Left: erect with stimulation  Nipple Erectility - Right: erect with stimulation  Areolas Compressibility: soft  Nipple Size: average  Special Equipment Used: none  Day mother reports milk came in:  Day 3    INFANT ASSESSMENT    Oral Anatomy  Mouth: normal  Palate: normal  Jaw: normal  Tongue: normal  Frenulum: normal   Digital Suck Exam: root    FEEDING   Feeding Time: aggressively for 20 minutes  Position:  crad;e  Effort to Latch: awake and alert, latched easily  Duration of Breast Feeding: Right Breast: 10 ; Left Breast: 10  Results: excellent breast feed    Volume of Intake:  Birth Weight: 8 lb 4.1 oz  Hospital discharge weight: 7 lb 14.8 oz  Today's Weight 7 lb 15.4 oz  Total Intake: 1.5 oz  Output: 4-5 soil diapers in last 24 hours, 4-5 wet diapers in last 24 hours    LATCH Score:   Latch: 2 - Good Latch  Audible  Swallowin - Spontaneous & frequent  Type of Nipple: (Breast/Nipple) 2 - Everted  Comfort: 2 - Soft, Nontender  Hold: 2 - No Assist   Total LATCH Score:  10    FEEDING PLAN    Home Feeding Plan: Continue to feed on demand when  elicits feeding cues with deep latch.  Babe should be eating 8-12 times in a 24 hour period.  Exclusivity explained and encouraged in the early weeks to establish breastfeeding and order in milk supply.  Rooming-in encouraged with explanation of the benefits.  Continue to apply expressed breast milk and Lanolin cream to nipples after feedings for healing and comfort.  Postpartum breastfeeding assessment completed and education provided.  Items included in the education are:   proper positioning and latch  effectiveness of feeding  manual expression  handling and storing breastmilk  maintenance of breastfeeding for the first 6 months  sign/symptoms of infant feeding issues requiring referral to qualified health care provider    LACTATION COMMENTS   Bilirubin level at 14.7 mg/dl. Hemoglobin level is 14.7 g/dL Dr. Haas notified. Patient status reported. No new orders received.  Deep latch explained for proper positioning of breast in infant's mouth, maximizing milk transfer and comfort.  Reassurance and encouragement provided in regard to mom's concerns about milk supply.  Follow-up support information provided.  Parents plan to keep [place] Well-Child Check with Dr. Haas as scheduled for 2 week well child check.      Face-to-face Time: 60 minutes with assessment and education.    Shari Juarez RN  2024  2:10 PM

## 2024-07-18 ENCOUNTER — MEDICAL CORRESPONDENCE (OUTPATIENT)
Dept: HEALTH INFORMATION MANAGEMENT | Facility: OTHER | Age: 29
End: 2024-07-18
Payer: COMMERCIAL

## 2024-07-22 ENCOUNTER — OFFICE VISIT (OUTPATIENT)
Dept: OBGYN | Facility: OTHER | Age: 29
End: 2024-07-22
Payer: COMMERCIAL

## 2024-07-22 VITALS
TEMPERATURE: 97.8 F | HEART RATE: 94 BPM | DIASTOLIC BLOOD PRESSURE: 62 MMHG | SYSTOLIC BLOOD PRESSURE: 108 MMHG | BODY MASS INDEX: 23.29 KG/M2 | WEIGHT: 137.8 LBS

## 2024-07-22 DIAGNOSIS — Z98.891 S/P CESAREAN SECTION: Primary | ICD-10-CM

## 2024-07-22 PROCEDURE — 99024 POSTOP FOLLOW-UP VISIT: CPT

## 2024-07-22 NOTE — PROGRESS NOTES
Postoperative Visit  2024    S: Dania Bhatti is a 28 year old  here for post-operative visit following c section on 24.  She reports feeling well. She reports she has no concerns with urination or bowel movements. Pain is well controlled. No signs of infection.     O:   /62   Pulse 94   Temp 97.8  F (36.6  C)   Wt 62.5 kg (137 lb 12.8 oz)   LMP 10/09/2023   Breastfeeding Yes   BMI 23.29 kg/m    Gen:  Well-appearing, NAD  Abd: incision is C/D/I  Pelvic: deferred    A/P:   Ms. Dania Bhatti is a 28 year old  2 weeks s/p c section. Doing well, no concerns.  Follow up in 4 weeks for 6 week check.     Peggy TERRAZAS, ELPIDIOP-C  2024 11:45 AM

## 2024-07-22 NOTE — NURSING NOTE
Pt presents to clinic today for 2 week post partum.      Medication Reconciliation: complete  Shanta Karimi LPN

## 2024-08-19 ENCOUNTER — PRENATAL OFFICE VISIT (OUTPATIENT)
Dept: OBGYN | Facility: OTHER | Age: 29
End: 2024-08-19
Payer: COMMERCIAL

## 2024-08-19 VITALS — HEART RATE: 76 BPM | SYSTOLIC BLOOD PRESSURE: 114 MMHG | DIASTOLIC BLOOD PRESSURE: 78 MMHG

## 2024-08-19 DIAGNOSIS — Z12.4 CERVICAL CANCER SCREENING: ICD-10-CM

## 2024-08-19 PROCEDURE — G0123 SCREEN CERV/VAG THIN LAYER: HCPCS

## 2024-08-19 PROCEDURE — 99207 PR POST-PARTUM 6 WK VISIT - GICH ONLY: CPT

## 2024-08-19 ASSESSMENT — EDINBURGH POSTNATAL DEPRESSION SCALE (EPDS)
I HAVE BEEN SO UNHAPPY THAT I HAVE BEEN CRYING: NO, NEVER
THE THOUGHT OF HARMING MYSELF HAS OCCURRED TO ME: NEVER
I HAVE BEEN ANXIOUS OR WORRIED FOR NO GOOD REASON: NO, NOT AT ALL
THINGS HAVE BEEN GETTING ON TOP OF ME: NO, I HAVE BEEN COPING AS WELL AS EVER
I HAVE FELT SCARED OR PANICKY FOR NO GOOD REASON: NO, NOT AT ALL
THINGS HAVE BEEN GETTING ON TOP OF ME: NO, I HAVE BEEN COPING AS WELL AS EVER
I HAVE BEEN ABLE TO LAUGH AND SEE THE FUNNY SIDE OF THINGS: AS MUCH AS I ALWAYS COULD
THE THOUGHT OF HARMING MYSELF HAS OCCURRED TO ME: NEVER
I HAVE BEEN ANXIOUS OR WORRIED FOR NO GOOD REASON: NO, NOT AT ALL
TOTAL SCORE: 0
I HAVE BEEN ABLE TO LAUGH AND SEE THE FUNNY SIDE OF THINGS: AS MUCH AS I ALWAYS COULD
I HAVE FELT SAD OR MISERABLE: NO, NOT AT ALL
I HAVE BEEN SO UNHAPPY THAT I HAVE HAD DIFFICULTY SLEEPING: NOT AT ALL
I HAVE LOOKED FORWARD WITH ENJOYMENT TO THINGS: AS MUCH AS I EVER DID
I HAVE LOOKED FORWARD WITH ENJOYMENT TO THINGS: AS MUCH AS I EVER DID
I HAVE FELT SAD OR MISERABLE: NO, NOT AT ALL
I HAVE BLAMED MYSELF UNNECESSARILY WHEN THINGS WENT WRONG: NO, NEVER
I HAVE BLAMED MYSELF UNNECESSARILY WHEN THINGS WENT WRONG: NO, NEVER
I HAVE BEEN SO UNHAPPY THAT I HAVE BEEN CRYING: NO, NEVER
I HAVE BEEN SO UNHAPPY THAT I HAVE HAD DIFFICULTY SLEEPING: NOT AT ALL
I HAVE FELT SCARED OR PANICKY FOR NO GOOD REASON: NO, NOT AT ALL

## 2024-08-19 ASSESSMENT — ANXIETY QUESTIONNAIRES
6. BECOMING EASILY ANNOYED OR IRRITABLE: NOT AT ALL
1. FEELING NERVOUS, ANXIOUS, OR ON EDGE: NOT AT ALL
2. NOT BEING ABLE TO STOP OR CONTROL WORRYING: NOT AT ALL
7. FEELING AFRAID AS IF SOMETHING AWFUL MIGHT HAPPEN: NOT AT ALL
GAD7 TOTAL SCORE: 0
GAD7 TOTAL SCORE: 0
7. FEELING AFRAID AS IF SOMETHING AWFUL MIGHT HAPPEN: NOT AT ALL
8. IF YOU CHECKED OFF ANY PROBLEMS, HOW DIFFICULT HAVE THESE MADE IT FOR YOU TO DO YOUR WORK, TAKE CARE OF THINGS AT HOME, OR GET ALONG WITH OTHER PEOPLE?: NOT DIFFICULT AT ALL
5. BEING SO RESTLESS THAT IT IS HARD TO SIT STILL: NOT AT ALL
IF YOU CHECKED OFF ANY PROBLEMS ON THIS QUESTIONNAIRE, HOW DIFFICULT HAVE THESE PROBLEMS MADE IT FOR YOU TO DO YOUR WORK, TAKE CARE OF THINGS AT HOME, OR GET ALONG WITH OTHER PEOPLE: NOT DIFFICULT AT ALL
3. WORRYING TOO MUCH ABOUT DIFFERENT THINGS: NOT AT ALL
4. TROUBLE RELAXING: NOT AT ALL

## 2024-08-19 NOTE — PROGRESS NOTES
6 week Postpartum Visit Note    S:  Ms. Dania Bhatti is a 29 year old  here for her 6-week postpartum checkup.   - Had a C section on 24.  - Infant gender:  boy, weight 8 pounds 4.1 oz.  - Feeding Method:  .  Complications reported with feeding:  none, infant thriving .    - Bleeding:  Light.  Duration:  1 week.  Menses resumed:  No  - Bowel/Urinary problems:  No  - Mood: doing well  - Sleep: doing well    [place] Depression Scale  Thoughts of Harming Self: Never  Total Score: 0      - Contraception Planned:  Natural Family Planning   - She  has not had intercourse since delivery..    - Current tobacco use:  No  - Hx of Abuse:  No  ================================================================  ROS: 10 point ROS neg other than the symptoms noted above in the HPI.     O:  /78   Pulse 76   LMP 10/09/2023   Breastfeeding Yes   Gen: Well-appearing, NAD  Psych:  NEGATIVE  Abd:  Benign, Soft, flat, non-tender, No masses, organomegaly, and Diastasis less than 1-2 FB  Inc:   well healed   Pelvic:  Normal appearing external female genitalia. Normal hair distribution. Vagina is without lesions. small discharge. Cervix normal, no lesions, no cervical motion tenderness. Uterus is small, mobile, non-tender. No adnexal tenderness or masses. PAP collected       A/P:  Ms. Dania Bhatti is a 29 year old  here for 6 week postpartum visit after c section. Doing well.  - Contraception: NFP  - Feeding: breast  - Follow-up: annual     Peggy TERRAZAS, FNP-C  2024 11:23 AM

## 2024-08-19 NOTE — NURSING NOTE
Patient presents to clinic for postpartum care.    Shelley Villagran LPN on 8/19/2024 at 11:12 AM

## 2024-08-21 LAB
BKR LAB AP GYN ADEQUACY: NORMAL
BKR LAB AP GYN INTERPRETATION: NORMAL
BKR LAB AP HPV REFLEX: NO
BKR LAB AP PREVIOUS ABNORMAL: NORMAL
PATH REPORT.COMMENTS IMP SPEC: NORMAL
PATH REPORT.COMMENTS IMP SPEC: NORMAL
PATH REPORT.RELEVANT HX SPEC: NORMAL

## 2024-10-08 ENCOUNTER — MEDICAL CORRESPONDENCE (OUTPATIENT)
Dept: HEALTH INFORMATION MANAGEMENT | Facility: OTHER | Age: 29
End: 2024-10-08
Payer: COMMERCIAL

## 2025-01-09 NOTE — ANESTHESIA PROCEDURE NOTES
Midwife Postpartum 6 Week Visit    Annalee Payton is a 33 postpartum depression.   Depression Rating was:     [place] TAP Procedure Note  Pre-Procedure   Staff -        CRNA: Inocente Hernandez APRN CRNA       Performed By: CRNA       Location: OR       Procedure Start/Stop Times: 2021 10:10 PM and 2021 10:21 PM       Pre-Anesthestic Checklist: patient identified, IV checked, site marked, risks and benefits discussed, informed consent, monitors and equipment checked, pre-op evaluation, at physician/surgeon's request and post-op pain management  Timeout:       Correct Patient: Yes        Correct Procedure: Yes        Correct Site: Yes        Correct Position: Yes        Correct Laterality: Yes        Site Marked: Yes  Procedure Documentation  Procedure: TAP       Diagnosis: POST OPERATIVE PAIN CONTROL       Laterality: bilateral       Patient Position: supine       Patient Prep/Sterile Barriers: sterile gloves, mask       Skin prep: Chloraprep       Needle Type: short bevel       Needle Gauge: 20.        Needle Length (Inches): 6        Ultrasound guided       1. Ultrasound was used to identify targeted nerve, plexus, vascular marker, or fascial plane and place a needle adjacent to it in real-time.       2. Ultrasound was used to visualize the spread of anesthetic in close proximity to the above referenced structure.       3. A permanent image is entered into the patient's record.       4. The visualized anatomic structures appeared normal.       5. There were no apparent abnormal pathologic findings.    Assessment/Narrative         The placement was negative for: painful injection and site bleeding       Paresthesias: No.      Bolus given via needle. No blood aspirated via catheter.        Secured via.        Insertion/Infusion Method: Single Shot       Complications: none    Comments:  Bilateral Transversus abdominus plane (TAP) block for post-op pain management. Ultrasound guidance used and images saved.    Patient: JOEY SHRESTHA  Patient : 1995  Date: 2021  Procedure time:  2210  Location:  "route once., Disp: , Rfl:     Prenatal Vit-Fe Fumarate-FA (PRENATAL TINA      Complications: Hemorrhage      Name: Olivier Yanez      Operating Room  Diagnosis: abdominal pain.  Indication: Surgeon requests block for post-op pain management.    The procedure, potential benefits, risks, and alternatives were discussed during the preoperative interview with the patient. The patient voiced understanding of the information and agreed to proceed.    A pause was conducted to verify correct patient ID and surgical site utilizing the written consentform and patient feedback.    Anesthesia: spinal  Block was performed pre-op in OR following delivery    The right abdomen in the midaxillary line was prepped with chlorhexidine. A 20G 6 inch spinal needle was inserted within the triangle of petit and advanced with ultrasound guidance until the needle tip was visualized between the fascial layers of the internal oblique and transversus abdominus. Injection of a small amount of fluid was utilized to confirm correct placement. 15ml of 0.25% ropivacaine with 4mg of preservative free decadron and 25 mcg precedex was injected incrementally, with confirmation of negative aspiration and visualization of hydro-dissection of local anesthetic between the muscle layers.     The left abdomen in the midaxillary line wasprepped with chlorhexidine.  A 20G 6 inch spinal needle was inserted within the triangle of petit and advanced with ultrasound guidance until the needle tip was visualized between the fascial layers of the internal oblique and transversus abdominus. Injection of a small amount of fluid was utilized to confirm correct placement. 15ml of 0.25% ropivacaine with 4mg of preservative free decadron and 25 mcg precedex was injected incrementally, with confirmation of negative aspiration and visualization of hydro-dissection of local anesthetic between the muscle layers     An image was saved to the ultrasound machine and prints were made for the chart.     Electronically signedby  NINI De La Paz CRNA   7/18/2021  10:05 PM               pregnancy level of activity/pre pregnancy tomorrow  Continue a multivitamin/prenatal supplement, especially if breastfeeding.  pregnancy or sexually transmitted infection.     Annalee Payton 03/04/2024 (Exact Date)   Breastfeeding Yes   BMI 30.69 kg/m      Pelvic Exam:   EG/BUS: normal genital architecture without lesions, erythema or abnormal secretions.   Vagina: moist, pink, rugae with physiologic discharge and secretions  Cervix: mid position multiparous no lesions and pink, moist, closed, without lesion or CMT  Uterus: anteverted, position, mobile, no pain  Adnexa: within normal limits and no masses, nodularity, tenderness    PROCEDURE NOTE: -- IUD Insertion    Reason for Insertion: contraception    Premedicated with ibuprofen.  Under sterile technique, cervix was visualized with speculum and prepped with Betadine solution swab x 3. The uterus sounded to 6.0 cm. IUD prepared for placement, and IUD inserted according to 's instructions without difficulty or significant resitance, and deployed at the fundus. The strings were visualized and trimmed to 3.0 cm from the external os. Tenaculum was removed and hemostasis noted. Speculum removed.  Patient tolerated procedure well.    EBL: minimal    Complications: none    NDC: 19995-884-40  LOT # [id number]  EXP: 02/01/PRISCILA MiguelM

## 2025-01-12 ENCOUNTER — HEALTH MAINTENANCE LETTER (OUTPATIENT)
Age: 30
End: 2025-01-12

## (undated) DEVICE — DRSG STERI STRIP 1/2X4" R1547

## (undated) DEVICE — ESU GROUND PAD ADULT W/CORD E7507

## (undated) DEVICE — SUTURE PLAIN GUT 3-0 CT-1 842H

## (undated) DEVICE — SLEEVE COMPRESSION SCD KNEE MED 74022

## (undated) DEVICE — PREP CHLORAPREP 26ML TINTED ORANGE  260815

## (undated) DEVICE — PACK C SECTION SMA15CSFCA

## (undated) DEVICE — SU VICRYL 0 CT-1 36" J346H

## (undated) DEVICE — SU VICRYL 0 CTX 36" J370H

## (undated) DEVICE — DYNJDINO

## (undated) DEVICE — STPL SKIN SUBCUTICULAR INSORB  2030

## (undated) DEVICE — COVER LIGHT HANDLE LT-F02

## (undated) DEVICE — ADH LIQUID MASTISOL TOPICAL VIAL 2-3ML 0523-48

## (undated) DEVICE — PAD ABD 5X9" STERILE 9190A

## (undated) DEVICE — DRSG MEDIPORE 3 1/2X8" 3570

## (undated) DEVICE — GLOVE PROTEXIS POWDER FREE SMT 6.5  2D72PT65X

## (undated) DEVICE — SOL WATER 1500ML

## (undated) DEVICE — GLOVE PROTEXIS BLUE W/NEU-THERA 6.5  2D73EB65

## (undated) DEVICE — SU CHROMIC 1 CTX 36" 905H

## (undated) DEVICE — PAD CHUX UNDERPAD 30X36" P3036C

## (undated) DEVICE — GLOVE PROTEXIS POWDER FREE SMT 7.5  2D72PT75X

## (undated) DEVICE — SU MONOCRYL 0 CT-1 36" UND Y946H

## (undated) DEVICE — Device

## (undated) DEVICE — SU VICRYL 3-0 CT 36" J356H

## (undated) DEVICE — SU VICRYL 4-0 KS 27" UND J662H

## (undated) DEVICE — SU CHROMIC 2-0 SH 27" G123H

## (undated) DEVICE — SU PDS II 0 CTX 60" Z990G

## (undated) DEVICE — SPONGE SURGIFOAM 12 1972

## (undated) DEVICE — SU MONOCRYL 3-0 PS-2 27" Y427H

## (undated) DEVICE — PENCIL MEGADYNE TELESCOPING SMOKE EVACUATION 10 FT 251010J

## (undated) DEVICE — ESU PENCIL SMOKE EVAC W/ROCKER SWITCH 0703-047-000

## (undated) RX ORDER — CEFAZOLIN SODIUM 1 G/3ML
INJECTION, POWDER, FOR SOLUTION INTRAMUSCULAR; INTRAVENOUS
Status: DISPENSED
Start: 2021-07-18

## (undated) RX ORDER — BUPIVACAINE HYDROCHLORIDE 5 MG/ML
INJECTION, SOLUTION EPIDURAL; INTRACAUDAL
Status: DISPENSED
Start: 2021-07-18

## (undated) RX ORDER — MORPHINE SULFATE 0.5 MG/ML
INJECTION, SOLUTION EPIDURAL; INTRATHECAL; INTRAVENOUS
Status: DISPENSED
Start: 2024-07-04

## (undated) RX ORDER — MORPHINE SULFATE 0.5 MG/ML
INJECTION, SOLUTION EPIDURAL; INTRATHECAL; INTRAVENOUS
Status: DISPENSED
Start: 2021-07-18

## (undated) RX ORDER — PHENYLEPHRINE HYDROCHLORIDE 10 MG/ML
INJECTION INTRAVENOUS
Status: DISPENSED
Start: 2021-07-18

## (undated) RX ORDER — OXYTOCIN/0.9 % SODIUM CHLORIDE 30/500 ML
PLASTIC BAG, INJECTION (ML) INTRAVENOUS
Status: DISPENSED
Start: 2024-07-04

## (undated) RX ORDER — FENTANYL CITRATE 50 UG/ML
INJECTION, SOLUTION INTRAMUSCULAR; INTRAVENOUS
Status: DISPENSED
Start: 2021-07-18

## (undated) RX ORDER — DEXMEDETOMIDINE HYDROCHLORIDE 4 UG/ML
INJECTION, SOLUTION INTRAVENOUS
Status: DISPENSED
Start: 2021-07-18

## (undated) RX ORDER — ONDANSETRON 2 MG/ML
INJECTION INTRAMUSCULAR; INTRAVENOUS
Status: DISPENSED
Start: 2024-07-04

## (undated) RX ORDER — SODIUM CHLORIDE, SODIUM LACTATE, POTASSIUM CHLORIDE, CALCIUM CHLORIDE 600; 310; 30; 20 MG/100ML; MG/100ML; MG/100ML; MG/100ML
INJECTION, SOLUTION INTRAVENOUS
Status: DISPENSED
Start: 2024-07-04

## (undated) RX ORDER — BUPIVACAINE HYDROCHLORIDE 2.5 MG/ML
INJECTION, SOLUTION EPIDURAL; INFILTRATION; INTRACAUDAL
Status: DISPENSED
Start: 2024-07-04

## (undated) RX ORDER — PHENYLEPHRINE HCL IN 0.9% NACL 50MG/250ML
PLASTIC BAG, INJECTION (ML) INTRAVENOUS
Status: DISPENSED
Start: 2024-07-04

## (undated) RX ORDER — SODIUM CHLORIDE 9 MG/ML
INJECTION, SOLUTION INTRAVENOUS
Status: DISPENSED
Start: 2021-07-18

## (undated) RX ORDER — OXYTOCIN/0.9 % SODIUM CHLORIDE 30/500 ML
PLASTIC BAG, INJECTION (ML) INTRAVENOUS
Status: DISPENSED
Start: 2021-07-18

## (undated) RX ORDER — KETOROLAC TROMETHAMINE 30 MG/ML
INJECTION, SOLUTION INTRAMUSCULAR; INTRAVENOUS
Status: DISPENSED
Start: 2021-07-18

## (undated) RX ORDER — KETOROLAC TROMETHAMINE 30 MG/ML
INJECTION, SOLUTION INTRAMUSCULAR; INTRAVENOUS
Status: DISPENSED
Start: 2019-03-11